# Patient Record
Sex: FEMALE
[De-identification: names, ages, dates, MRNs, and addresses within clinical notes are randomized per-mention and may not be internally consistent; named-entity substitution may affect disease eponyms.]

---

## 2018-01-08 ENCOUNTER — APPOINTMENT (OUTPATIENT)
Dept: SURGICAL ONCOLOGY | Facility: CLINIC | Age: 68
End: 2018-01-08
Payer: MEDICARE

## 2018-01-08 ENCOUNTER — RESULT REVIEW (OUTPATIENT)
Age: 68
End: 2018-01-08

## 2018-01-08 PROBLEM — Z00.00 ENCOUNTER FOR PREVENTIVE HEALTH EXAMINATION: Status: ACTIVE | Noted: 2018-01-08

## 2018-01-08 PROCEDURE — 99204 OFFICE O/P NEW MOD 45 MIN: CPT | Mod: 25

## 2018-01-08 PROCEDURE — 19083 BX BREAST 1ST LESION US IMAG: CPT

## 2018-07-09 ENCOUNTER — APPOINTMENT (OUTPATIENT)
Dept: SURGICAL ONCOLOGY | Facility: CLINIC | Age: 68
End: 2018-07-09

## 2024-08-10 ENCOUNTER — NON-APPOINTMENT (OUTPATIENT)
Age: 74
End: 2024-08-10

## 2024-08-24 ENCOUNTER — NON-APPOINTMENT (OUTPATIENT)
Age: 74
End: 2024-08-24

## 2024-08-25 ENCOUNTER — INPATIENT (INPATIENT)
Facility: HOSPITAL | Age: 74
LOS: 2 days | Discharge: HOME | DRG: 603 | End: 2024-08-28
Attending: INTERNAL MEDICINE | Admitting: INTERNAL MEDICINE
Payer: MEDICARE

## 2024-08-25 VITALS
DIASTOLIC BLOOD PRESSURE: 68 MMHG | OXYGEN SATURATION: 100 % | SYSTOLIC BLOOD PRESSURE: 103 MMHG | HEART RATE: 74 BPM | TEMPERATURE: 98 F | WEIGHT: 199.96 LBS | RESPIRATION RATE: 20 BRPM | HEIGHT: 69 IN

## 2024-08-25 DIAGNOSIS — L03.90 CELLULITIS, UNSPECIFIED: ICD-10-CM

## 2024-08-25 LAB
ALBUMIN SERPL ELPH-MCNC: 3.3 G/DL — SIGNIFICANT CHANGE UP (ref 3.3–5)
ALP SERPL-CCNC: 154 U/L — HIGH (ref 40–120)
ALT FLD-CCNC: 68 U/L — HIGH (ref 10–45)
ANION GAP SERPL CALC-SCNC: 13 MMOL/L — SIGNIFICANT CHANGE UP (ref 5–17)
AST SERPL-CCNC: 24 U/L — SIGNIFICANT CHANGE UP (ref 10–40)
BASOPHILS # BLD AUTO: 0 K/UL — SIGNIFICANT CHANGE UP (ref 0–0.2)
BASOPHILS NFR BLD AUTO: 0 % — SIGNIFICANT CHANGE UP (ref 0–2)
BILIRUB SERPL-MCNC: 0.3 MG/DL — SIGNIFICANT CHANGE UP (ref 0.2–1.2)
BUN SERPL-MCNC: 20 MG/DL — SIGNIFICANT CHANGE UP (ref 7–23)
CALCIUM SERPL-MCNC: 8.8 MG/DL — SIGNIFICANT CHANGE UP (ref 8.4–10.5)
CHLORIDE SERPL-SCNC: 102 MMOL/L — SIGNIFICANT CHANGE UP (ref 96–108)
CO2 SERPL-SCNC: 22 MMOL/L — SIGNIFICANT CHANGE UP (ref 22–31)
CREAT SERPL-MCNC: 0.93 MG/DL — SIGNIFICANT CHANGE UP (ref 0.5–1.3)
CRP SERPL-MCNC: 15 MG/L — HIGH (ref 0–4)
EGFR: 64 ML/MIN/1.73M2 — SIGNIFICANT CHANGE UP
EOSINOPHIL # BLD AUTO: 0.24 K/UL — SIGNIFICANT CHANGE UP (ref 0–0.5)
EOSINOPHIL NFR BLD AUTO: 0.7 % — SIGNIFICANT CHANGE UP (ref 0–6)
GAS PNL BLDV: SIGNIFICANT CHANGE UP
GLUCOSE SERPL-MCNC: 124 MG/DL — HIGH (ref 70–99)
HCT VFR BLD CALC: 34.8 % — SIGNIFICANT CHANGE UP (ref 34.5–45)
HGB BLD-MCNC: 11.6 G/DL — SIGNIFICANT CHANGE UP (ref 11.5–15.5)
LYMPHOCYTES # BLD AUTO: 24.74 K/UL — HIGH (ref 1–3.3)
LYMPHOCYTES # BLD AUTO: 71 % — HIGH (ref 13–44)
MANUAL SMEAR VERIFICATION: SIGNIFICANT CHANGE UP
MCHC RBC-ENTMCNC: 29.8 PG — SIGNIFICANT CHANGE UP (ref 27–34)
MCHC RBC-ENTMCNC: 33.3 GM/DL — SIGNIFICANT CHANGE UP (ref 32–36)
MCV RBC AUTO: 89.5 FL — SIGNIFICANT CHANGE UP (ref 80–100)
MONOCYTES # BLD AUTO: 1.01 K/UL — HIGH (ref 0–0.9)
MONOCYTES NFR BLD AUTO: 2.9 % — SIGNIFICANT CHANGE UP (ref 2–14)
MRSA PCR RESULT.: SIGNIFICANT CHANGE UP
NEUTROPHILS # BLD AUTO: 8.85 K/UL — HIGH (ref 1.8–7.4)
NEUTROPHILS NFR BLD AUTO: 25.4 % — LOW (ref 43–77)
PLAT MORPH BLD: NORMAL — SIGNIFICANT CHANGE UP
PLATELET # BLD AUTO: 194 K/UL — SIGNIFICANT CHANGE UP (ref 150–400)
POTASSIUM SERPL-MCNC: 4.9 MMOL/L — SIGNIFICANT CHANGE UP (ref 3.5–5.3)
POTASSIUM SERPL-SCNC: 4.9 MMOL/L — SIGNIFICANT CHANGE UP (ref 3.5–5.3)
PROT SERPL-MCNC: 6 G/DL — SIGNIFICANT CHANGE UP (ref 6–8.3)
RBC # BLD: 3.89 M/UL — SIGNIFICANT CHANGE UP (ref 3.8–5.2)
RBC # FLD: 14.6 % — HIGH (ref 10.3–14.5)
RBC BLD AUTO: SIGNIFICANT CHANGE UP
S AUREUS DNA NOSE QL NAA+PROBE: SIGNIFICANT CHANGE UP
SMUDGE CELLS # BLD: PRESENT — SIGNIFICANT CHANGE UP
SODIUM SERPL-SCNC: 137 MMOL/L — SIGNIFICANT CHANGE UP (ref 135–145)
WBC # BLD: 34.85 K/UL — HIGH (ref 3.8–10.5)
WBC # FLD AUTO: 34.85 K/UL — HIGH (ref 3.8–10.5)

## 2024-08-25 PROCEDURE — 93970 EXTREMITY STUDY: CPT | Mod: 26

## 2024-08-25 PROCEDURE — 99285 EMERGENCY DEPT VISIT HI MDM: CPT | Mod: FS

## 2024-08-25 PROCEDURE — 73590 X-RAY EXAM OF LOWER LEG: CPT | Mod: 26,LT

## 2024-08-25 PROCEDURE — 73630 X-RAY EXAM OF FOOT: CPT | Mod: 26,LT

## 2024-08-25 RX ORDER — HEPARIN SODIUM,BOVINE 1000/ML
5000 VIAL (ML) INJECTION EVERY 12 HOURS
Refills: 0 | Status: DISCONTINUED | OUTPATIENT
Start: 2024-08-25 | End: 2024-08-28

## 2024-08-25 RX ORDER — SENNA 187 MG
2 TABLET ORAL AT BEDTIME
Refills: 0 | Status: DISCONTINUED | OUTPATIENT
Start: 2024-08-25 | End: 2024-08-28

## 2024-08-25 RX ORDER — CEFAZOLIN SODIUM 2 G/100ML
2000 INJECTION, SOLUTION INTRAVENOUS ONCE
Refills: 0 | Status: COMPLETED | OUTPATIENT
Start: 2024-08-25 | End: 2024-08-25

## 2024-08-25 RX ORDER — ACETAMINOPHEN 325 MG/1
1000 TABLET ORAL ONCE
Refills: 0 | Status: COMPLETED | OUTPATIENT
Start: 2024-08-25 | End: 2024-08-25

## 2024-08-25 RX ORDER — SODIUM CHLORIDE 9 MG/ML
500 INJECTION INTRAMUSCULAR; INTRAVENOUS; SUBCUTANEOUS ONCE
Refills: 0 | Status: COMPLETED | OUTPATIENT
Start: 2024-08-25 | End: 2024-08-25

## 2024-08-25 RX ORDER — CEFAZOLIN SODIUM 2 G/100ML
1000 INJECTION, SOLUTION INTRAVENOUS EVERY 8 HOURS
Refills: 0 | Status: DISCONTINUED | OUTPATIENT
Start: 2024-08-25 | End: 2024-08-28

## 2024-08-25 RX ORDER — DOXYCYCLINE MONOHYDRATE 100 MG
100 TABLET ORAL ONCE
Refills: 0 | Status: COMPLETED | OUTPATIENT
Start: 2024-08-25 | End: 2024-08-25

## 2024-08-25 RX ADMIN — ACETAMINOPHEN 400 MILLIGRAM(S): 325 TABLET ORAL at 14:28

## 2024-08-25 RX ADMIN — SODIUM CHLORIDE 500 MILLILITER(S): 9 INJECTION INTRAMUSCULAR; INTRAVENOUS; SUBCUTANEOUS at 14:26

## 2024-08-25 RX ADMIN — Medication 100 MILLIGRAM(S): at 16:40

## 2024-08-25 RX ADMIN — Medication 2 TABLET(S): at 21:31

## 2024-08-25 RX ADMIN — CEFAZOLIN SODIUM 100 MILLIGRAM(S): 2 INJECTION, SOLUTION INTRAVENOUS at 16:06

## 2024-08-25 RX ADMIN — CEFAZOLIN SODIUM 100 MILLIGRAM(S): 2 INJECTION, SOLUTION INTRAVENOUS at 23:37

## 2024-08-25 NOTE — H&P ADULT - NSHPPHYSICALEXAM_GEN_ALL_CORE
T(C): 36.8 (08-25-24 @ 12:40), Max: 36.8 (08-25-24 @ 12:40)  HR: 74 (08-25-24 @ 12:40) (74 - 74)  BP: 103/68 (08-25-24 @ 12:40) (103/68 - 103/68)  RR: 20 (08-25-24 @ 12:40) (20 - 20)  SpO2: 100% (08-25-24 @ 12:40) (100% - 100%)  GENERAL: NAD, lying in bed comfortably  HEAD:  Atraumatic, normocephalic  EYES: EOMI, PERRLA, conjunctiva and sclera clear  NECK: Supple, trachea midline, no JVD  HEART: Regular rate and rhythm, no murmurs, rubs, or gallops  LUNGS: Unlabored respirations.  Clear to auscultation bilaterally, no crackles, wheezing, or rhonchi  ABDOMEN: Soft, nontender, nondistended, +BS  EXTREMITIES: 2+ peripheral pulses bilaterally. No clubbing, cyanoss    left  arm,  swollen/  redness  NERVOUS SYSTEM:  A&Ox3, moving all extremities, no focal deficits   SKIN: No rashes or lesions T(C): 36.8 (08-25-24 @ 12:40), Max: 36.8 (08-25-24 @ 12:40)  HR: 74 (08-25-24 @ 12:40) (74 - 74)  BP: 103/68 (08-25-24 @ 12:40) (103/68 - 103/68)  RR: 20 (08-25-24 @ 12:40) (20 - 20)  SpO2: 100% (08-25-24 @ 12:40) (100% - 100%)  GENERAL: NAD, lying in bed comfortably  HEAD:  Atraumatic, normocephalic  EYES: EOMI, PERRLA, conjunctiva and sclera clear  NECK: Supple, trachea midline, no JVD  HEART: Regular rate and rhythm, no murmurs, rubs, or gallops  LUNGS: Unlabored respirations.  Clear to auscultation bilaterally, no crackles, wheezing, or rhonchi  ABDOMEN: Soft, nontender, nondistended, +BS  EXTREMITIES:   No clubbing, cyanoss    left   distal  leg/  foot,    swollen/  redness  NERVOUS SYSTEM:  A&Ox3, moving all extremities, no focal deficits   SKIN: No rashes or lesions

## 2024-08-25 NOTE — DISCHARGE NOTE PROVIDER - CARE PROVIDER_API CALL
Liz Vanegas  Podiatric Medicine and Surgery  [address] [place] [id number]  Phone: [phone]  Fax: [phone]  Follow Up Time: 1 week    IBAN KINSEY  Phone: [phone]  Fax: [phone]  Follow Up Time: 1-3 days

## 2024-08-25 NOTE — DISCHARGE NOTE PROVIDER - NSDCFUADDAPPT_GEN_ALL_CORE_FT
Podiatry Discharge Instructions:  Follow up: Please follow up with Dr. Hendrickson within 1 week of discharge from the hospital, please call [phone] for appointment and discuss that you recently were seen in the hospital.  Wound Care: n/a  Weight bearing: Please weight bear as tolerated.  Antibiotics: Please continue as instructed.

## 2024-08-25 NOTE — DISCHARGE NOTE PROVIDER - NSDCCPCAREPLAN_GEN_ALL_CORE_FT
PRINCIPAL DISCHARGE DIAGNOSIS  Diagnosis: Cellulitis  Assessment and Plan of Treatment: Take all of your antibiotics as ordered.  Call your Health Care Provider within two days of arriving home to make a follow up appointment within one week.  If the affected cellulitic area increases in redness, warmth, pain or swelling call your Health Care Provider.  If you develop fever, chills, and/or malaise, call your Health Care Provider.        SECONDARY DISCHARGE DIAGNOSES  Diagnosis: CLL (chronic lymphocytic leukemia)  Assessment and Plan of Treatment: Outpatient follow up with hematology.     PRINCIPAL DISCHARGE DIAGNOSIS  Diagnosis: Cellulitis  Assessment and Plan of Treatment: Left lower extremity improved erythema and +1 pitting edema from digits to proximal midshin, no fluctuance, no bogginess, no drainage, no open wounds. Right lower extremity edema, no open wounds, no acute signs of infection.   - Right foot xray: no OM, no gas.   - Left foot MRI: no abscess, no OM  - Recommend continue antibiotics :Keflex   - No acute podiatric surgical intervention at this time  Follow up with Podraitry   Take all of your antibiotics as ordered.  Call your Health Care Provider within two days of arriving home to make a follow up appointment within one week.  If the affected cellulitic area increases in redness, warmth, pain or swelling call your Health Care Provider.  If you develop fever, chills, and/or malaise, call your Health Care Provider.        SECONDARY DISCHARGE DIAGNOSES  Diagnosis: CLL (chronic lymphocytic leukemia)  Assessment and Plan of Treatment: Follows with Dr Lange of [facility]  --pt w/ known pelvic/inguinal lymphadenopathy. Please have [facility] review imaging done inpatient on 8/27 at [facility].  Outpatient follow up with hematology.

## 2024-08-25 NOTE — H&P ADULT - NSHPLABSRESULTS_GEN_ALL_CORE
LABS:                        11.6   34.85 )-----------( 194      ( 25 Aug 2024 14:23 )             34.8     08-25    137  |  102  |  20  ----------------------------<  124<H>  4.9   |  22  |  0.93    Ca    8.8      25 Aug 2024 14:23    TPro  6.0  /  Alb  3.3  /  TBili  0.3  /  DBili  x   /  AST  24  /  ALT  68<H>  /  AlkPhos  154<H>  08-25          Urinalysis Basic - ( 25 Aug 2024 14:23 )    Color: x / Appearance: x / SG: x / pH: x  Gluc: 124 mg/dL / Ketone: x  / Bili: x / Urobili: x   Blood: x / Protein: x / Nitrite: x   Leuk Esterase: x / RBC: x / WBC x   Sq Epi: x / Non Sq Epi: x / Bacteria: x          08-25 @ 14:10  4.5  19

## 2024-08-25 NOTE — ED ADULT NURSE NOTE - NSFALLRISKINTERV_ED_ALL_ED

## 2024-08-25 NOTE — H&P ADULT - ASSESSMENT
73 y/o female         PMHx  CLL  being monitored  with [facility],           DM,    h/o   breast cancer s/p chemo radiation,    withj   chronic lymphedema RLE           presenting to the ED with LLE pain swelling and erythema  for  6  weeks          pt   reported the symptoms started 6 weeks ago with fevers and worsened over last two weeks.        pain  worsened over the last two days.   sent  to e r by   u/c,  pt  was on a  5 day course of antibiotics on 7/1/24. /    denies   cp/sob , prolonged sedentary period, trauma, fall, drainage     admitted with  left leg  pain/  swelling  for  6  weeks.  denies   trauma/  falls          was  at  [facility],  had  redness   feet/  tx  with  anti    fungal  rx         also  has  had  night  sweats   for past  week    DM         not on med s at  home   Ca  breast,   right ,   about   5  yrs  ago / s/p  lumpectomy  .  with     c/c right  arm  edema           onc [facility]    CLL,  f/p  at  [facility],  caio gaytan       baseline  wbc  in 30, 000  range    dvt  ppx      doppler   legs ,  no  dvt       discussed   with  daughter        pt  is  full  code         rad                              75 y/o female         PMHx  CLL  being monitored  with [facility],           DM,    h/o   breast cancer s/p chemo radiation,    withj   chronic lymphedema RLE           presenting to the ED with LLE pain swelling and erythema  for  6  weeks          pt   reported the symptoms started 6 weeks ago with fevers and worsened over last two weeks.        pain  worsened over the last two days.   sent  to e r by   u/c,  pt  was on a  5 day course of antibiotics on 7/1/24. /    denies   cp/sob , prolonged sedentary period, trauma, fall, drainage     admitted with  left leg  pain/  swelling  for  6  weeks.  denies   trauma/  falls.    was  at  [facility],  had  redness   feet/  tx  with  anti    fungal  rx           cellulitis  leg.  on iv ancef             house  ID              also  has  had  night  sweats   for past  week/  suspect  from  CLL.  ctc /ap,            DM         not on med s at  home   Ca  breast,   right ,   about   5  yrs  ago / s/p  lumpectomy  .  with     c/c right  arm  edema           onc [facility]    CLL,  f/p  at  [facility],  caio gaytan       baseline  wbc  in 30, 000  range    dvt  ppx      doppler   legs ,  no  dvt       discussed   with  daughter        pt  is  full  code                                      75 y/o female           h/o   CLL  being monitored  with [facility],           DM,  ,, h/o   breast cancer s/p chemo  radiation,    mild  chronic lymphedema RLE             c/o   LLE pain swelling and erythema  for  6  weeks ,  with fevers and worsened over last two weeks.         pain  worsened over the last two days.   sent  to e r by   u/c,  pt  was on a  5 day course of antibiotics on 7/1/24. /    denies   cp/sob , prolonged sedentary period, trauma, fall, drainage     admitted with  left leg  pain/ distal  leg  swelling / redness  for  6  weeks.  denies   trauma/  falls.    was  at  [facility],  had  redness of   foot,   tx  with  anti    fungal  rx           cellulitis  leg.  on iv ancef            ct  foot/ r/o collection              house  ID              also  has  had  night  sweats   for past  week/  suspect  from  CLL.   /CT  c /ap,            DM         not on meds    at  home, per  daughter    Ca  breast,   right ,   about   5  yrs  ago / s/p  lumpectomy  .  with      mild  c/c right  arm  edema             [place] [facility]    [place]           [place]  [facility]  [place]  [place]          baseline  wbc  in 30, 000  range    dvt  ppx        doppler   legs ,  no  dvt       discussed   with  daughter        pt  is  full  code                                      75 y/o female           h/o   CLL  being monitored  with [facility],           DM,  ,, h/o   breast cancer s/p chemo  radiation,    mild  chronic lymphedema RLE             c/o   LLE pain swelling and erythema  for  6  weeks ,  with fevers and worsened over last two weeks.         pain  worsened over the last two days.   sent  to e r by   u/c,  pt  was on a  5 day course of antibiotics on 7/1/24. /    denies   cp/sob , prolonged sedentary period, trauma, fall, drainage     admitted with  left leg  pain/ distal  leg  swelling / redness  for  6  weeks.  denies   trauma/  falls.    was  at  [facility],  had  redness of   foot,   tx  with  anti    fungal  rx           cellulitis   distal  leg. / foot /  pt  walks  bare foot  at  home              on iv ancef             ct  foot/ r/o collection /  marked  swelling dorsum  foot             house  ID  /  podiatry  eval  called            also  has  had  night  sweats   for past  week/  suspect  from  CLL.   /CT  c /ap,            DM         not on meds    at  home, per  daughter    Ca  breast,   right ,   about   5  yrs  ago / s/p  lumpectomy  .  with      mild  c/c right  arm  edema             onc [facility]    [facility]           [facility]  not  [facility]          baseline  wbc  in 30, 000  range    dvt  ppx        doppler   legs ,  no  dvt       discussed   with  daughter        pt  is  full  code

## 2024-08-25 NOTE — ED ADULT NURSE NOTE - OBJECTIVE STATEMENT
74 y.o. female coming in from  via private car for swelling of b/l lower extremities and redness noted to LLE. pt states that she was seen by her PCP and was started on ABX on 7/1/24 and states that she had improved symptoms but states that about 2 weeks ago the redness/swelling/pain returned with worsening over the last 2 days. pt went to  today and was told to come into the ER for farther evaluation and concern for osteomyelitis. PMH CLL. A&Ox3, vss, difficulty ambulating r/t pain in LLE, no drainage noted from LLE, pt denies CP/SOB/weakness/dizziness/fevers/chills/N/V/D/urinary symptoms, no other complaints at this time.

## 2024-08-25 NOTE — DISCHARGE NOTE PROVIDER - NSDCMRMEDTOKEN_GEN_ALL_CORE_FT
Ambien 10 mg oral tablet: 1 tab(s) orally once a day as needed for  insomnia  amLODIPine 5 mg oral tablet: 1 tab(s) orally once a day  atorvastatin 40 mg oral tablet: 1 tab(s) orally once a day  ergocalciferol 1.25 mg (50,000 intl units) oral capsule: 1 cap(s) orally every 10 days  hydroCHLOROthiazide 25 mg oral tablet: 1 tab(s) orally once a day  Janumet 50 mg-500 mg oral tablet: 1 tab(s) orally 2 times a day  losartan 25 mg oral tablet: 1 tab(s) orally once a day  magnesium oxide 400 mg oral tablet: 1 tab(s) orally once a day  pentoxifylline 400 mg oral tablet, extended release: 1 tab(s) orally once a day  primidone 50 mg oral tablet: 1 tab(s) orally 2 times a day  propranolol 80 mg oral tablet: 1 tab(s) orally 2 times a day  sertraline 50 mg oral tablet: 1 tab(s) orally once a day  Vascepa 1 g oral capsule: 2 cap(s) orally once a day   Ambien 10 mg oral tablet: 1 tab(s) orally once a day as needed for  insomnia  atorvastatin 40 mg oral tablet: 1 tab(s) orally once a day  cephalexin 500 mg oral capsule: 1 cap(s) orally 4 times a day  ergocalciferol 1.25 mg (50,000 intl units) oral capsule: 1 cap(s) orally every 10 days  hydroCHLOROthiazide 25 mg oral tablet: 1 tab(s) orally once a day  Janumet 50 mg-500 mg oral tablet: 1 tab(s) orally 2 times a day  losartan 25 mg oral tablet: 1 tab(s) orally once a day  magnesium oxide 400 mg oral tablet: 1 tab(s) orally once a day  primidone 50 mg oral tablet: 1 tab(s) orally 2 times a day  propranolol 80 mg oral tablet: 1 tab(s) orally 2 times a day  senna leaf extract oral tablet: 2 tab(s) orally once a day (at bedtime)  sertraline 50 mg oral tablet: 1 tab(s) orally once a day  Vascepa 1 g oral capsule: 2 cap(s) orally once a day

## 2024-08-25 NOTE — DISCHARGE NOTE PROVIDER - NSDCADMDATE_GEN_ALL_CORE_FT
25-Aug-2024 15:21
Plan: Discussed possibility of allergy testing by an allergist
Discontinue Regimen: Allegra D
Detail Level: Detailed
Otc Regimen: Allegra QAM x 1 week\\nClaritin QHS x 1 week \\nIncrease to 2 Claritin QHS if not improved and will follow up in 1 month

## 2024-08-25 NOTE — H&P ADULT - HISTORY OF PRESENT ILLNESS
·  75 y/o female         PMHx CLL [place] [facility] DM, breast cancer s/p chemo radiation  now, chronic lymphedema RLE           presenting to the ED with LLE pain swelling and erythema      , Patient reported the symptoms started 6 weeks ago with fevers and worsened over last two weeks.       pain  worsened over the last two days. Sent in by Urgent Care ,  reyna otto on a  5 day course of antibiotics on 7/1/24.         deneis   cp/sob , prolonged sedentary period, trauma, fall, drainage   ·  75 y/o female         PMHx CLL [place] [facility] DM, breast cancer s/p chemo radiation  now, chronic lymphedema RLE           presenting to the ED with LLE pain swelling and erythema  for  6  weeks          pt   reported the symptoms started 6 weeks ago with fevers and worsened over last two weeks.       pain  worsened over the last two days.         sent  to e r by   u/c,  pt  was on a  5 day course of antibiotics on 7/1/24.         denies   cp/sob , prolonged sedentary period, trauma, fall, drainage

## 2024-08-25 NOTE — DISCHARGE NOTE PROVIDER - PROVIDER TOKENS
PROVIDER:[TOKEN:[[id number]],FOLLOWUP:[1 week]],PROVIDER:[TOKEN:[[id number]],FOLLOWUP:[1-3 days]]

## 2024-08-25 NOTE — PATIENT PROFILE ADULT - FALL HARM RISK - UNIVERSAL INTERVENTIONS
Bed in lowest position, wheels locked, appropriate side rails in place/Call bell, personal items and telephone in reach/Instruct patient to call for assistance before getting out of bed or chair/Non-slip footwear when patient is out of bed/[place] to call system/Physically safe environment - no spills, clutter or unnecessary equipment/Purposeful Proactive Rounding/Room/bathroom lighting operational, light cord in reach

## 2024-08-25 NOTE — DISCHARGE NOTE PROVIDER - HOSPITAL COURSE
HPI:  ·  73 y/o female         PMHx CLL being monitored with [facility], DM, breast cancer s/p chemo radiation  now, chronic lymphedema RLE           presenting to the ED with LLE pain swelling and erythema  for  6  weeks          pt   reported the symptoms started 6 weeks ago with fevers and worsened over last two weeks.       pain  worsened over the last two days.         sent  to e r by   u/c,  pt  was on a  5 day course of antibiotics on 7/1/24.         denies   cp/sob , prolonged sedentary period, trauma, fall, drainage   (25 Aug 2024 16:16)    Hospital Course: Patient admitted for LE erythema and swelling , patient seen by podiatry and Infectious disease, imaging of lower extremity show no osteomyelitis . Sandra had wound care provided by podiatry. and treated with antibiotics. Patient is now medically cleared for discharge and c/w oral antibiotics (keflex)  as instructed. Patient has CLL  --follows with Dr Lange of [facility]  --pt w/ known pelvic/inguinal lymphadenopathy. Will have [facility] review imaging done inpatient on 8/27  --on surveillance, ongoing care after discharge.  Patient is now medically cleared for discharge and outpatient follow up with hematology and podiatry            Important Medication Changes and Reason: keflex    Active or Pending Issues Requiring Follow-up: hematology and podiatry    Advanced Directives:   [x ] Full code  [ ] DNR  [ ] Hospice    Discharge Diagnoses:  CLL  cellulitis of lower extremity  DM           HPI:  ·  73 y/o female         PMHx CLL being monitored with [facility], DM, breast cancer s/p chemo radiation  now, chronic lymphedema RLE           presenting to the ED with LLE pain swelling and erythema  for  6  weeks          pt   reported the symptoms started 6 weeks ago with fevers and worsened over last two weeks.       pain  worsened over the last two days.         sent  to e r by   u/c,  pt  was on a  5 day course of antibiotics on 7/1/24.         denies   cp/sob , prolonged sedentary period, trauma, fall, drainage   (25 Aug 2024 16:16)    Hospital Course: Patient admitted for LE erythema and swelling, found to have cellulitis. Podiatry and Infectious diseases consulted ->  imaging of lower extremity show no osteomyelitis. Patient had wound care provided by podiatry and treated with ancef.  Of note pt verbalized having night sweats Patient has hx off CLL, follows with Dr Lange of [facility]  Pt w/ known pelvic/inguinal lymphadenopathy. Will have [facility] review imaging done inpatient on 8/27. On surveillance, ongoing care after discharge. Patient is now medically cleared for discharge and c/w oral antibiotics (keflex)  as instructed. Patient is now medically cleared for discharge and outpatient follow up with hematology and podiatry          Important Medication Changes and Reason: keflex    Active or Pending Issues Requiring Follow-up: hematology and podiatry    Advanced Directives:   [x ] Full code  [ ] DNR  [ ] Hospice    Discharge Diagnoses:  CLL  cellulitis of lower extremity  DM

## 2024-08-25 NOTE — ED PROVIDER NOTE - OBJECTIVE STATEMENT
73 y/o female PMHx CLL [place] [facility], breast cancer s/p chemo radiation now, chronic lymphedema RLE presenting to the ED with LLE pain swelling and erythema, Patient reported the symptoms started 6 weeks ago with fevers and worsened over last two weeks. Pain worsened over the last two days. Sent in by Urgent Care concern for osteo. Was on 5 day course of antibiotics on 7/1/24. Denied CP, SOB, prolonged sedentary period, trauma, fall, drainage

## 2024-08-25 NOTE — CONSULT NOTE ADULT - SUBJECTIVE AND OBJECTIVE BOX
Patient is a 74y old  Female who presents with a chief complaint of LLE swelling/  pain (25 Aug 2024 16:16)      HPI:  ·  75 y/o female         PMHx CLL being monitored with [facility] [place], DM, breast cancer s/p chemo radiation  now, chronic lymphedema RLE         presenting to the ED with LLE pain swelling and erythema  for  6  weeks          pt   reported the symptoms started 6 weeks ago with fevers and worsened over last two weeks.       pain  worsened over the last two days.         sent  to e r by   u/c,  pt  was on a  5 day course of antibiotics on 7/1/24.         denies   cp/sob , prolonged sedentary period, trauma, fall, drainage   (25 Aug 2024 16:16)      PAST MEDICAL & SURGICAL HISTORY:  Breast cancer          MEDICATIONS  (STANDING):  ceFAZolin   IVPB 1000 milliGRAM(s) IV Intermittent every 8 hours  heparin   Injectable 5000 Unit(s) SubCutaneous every 12 hours  senna 2 Tablet(s) Oral at bedtime    MEDICATIONS  (PRN):      Allergies    No Known Allergies    Intolerances        VITALS:    Vital Signs Last 24 Hrs  T(C): 36.8 (25 Aug 2024 12:40), Max: 36.8 (25 Aug 2024 12:40)  T(F): 98.3 (25 Aug 2024 12:40), Max: 98.3 (25 Aug 2024 12:40)  HR: 74 (25 Aug 2024 12:40) (74 - 74)  BP: 103/68 (25 Aug 2024 12:40) (103/68 - 103/68)  BP(mean): --  RR: 20 (25 Aug 2024 12:40) (20 - 20)  SpO2: 100% (25 Aug 2024 12:40) (100% - 100%)    Parameters below as of 25 Aug 2024 12:40  Patient On (Oxygen Delivery Method): room air        LABS:                          11.6   34.85 )-----------( 194      ( 25 Aug 2024 14:23 )             34.8       08-25    137  |  102  |  20  ----------------------------<  124<H>  4.9   |  22  |  0.93    Ca    8.8      25 Aug 2024 14:23    TPro  6.0  /  Alb  3.3  /  TBili  0.3  /  DBili  x   /  AST  24  /  ALT  68<H>  /  AlkPhos  154<H>  08-25      CAPILLARY BLOOD GLUCOSE              LOWER EXTREMITY PHYSICAL EXAM:    Vascular: DP/PT _/4, B/L, CFT <_ seconds B/L, Temperature gradient _, B/L.   Neuro: Epicritic sensation _ to the level of _, B/L.  Musculoskeletal/Ortho:  Skin: Left lower extremity erythema and edema from digits to proximal midshin, no open wounds. Right lower extremity edema, no open wounds, no acute signs of infection.       RADIOLOGY & ADDITIONAL STUDIES:      ACC: [id number] EXAM:  XR TIB FIB AP LAT 2 VIEWS LT   ORDERED BY: CIARAN ALMENDAREZ     ACC: [id number] EXAM:  XR FOOT COMP MIN 3 VIEWS LT   ORDERED BY: CIARAN ALMENDAREZ     PROCEDURE DATE:  08/25/2024          INTERPRETATION:  CLINICAL INDICATION: Worsening left foot pain swelling   and redness, evaluate for gas..  TECHNIQUE: 3 view left foot. 2 view left tibia.    COMPARISON: None available.    FINDINGS:    No acute fracture or dislocation. No destructive bony lesions or   periosteal reaction noted. Plantar and dorsal calcaneal spurs. Mild soft   tissue swelling.  Partially imaged degenerative changes of the left knee.    IMPRESSION:  No fracture or dislocation.  No definite radiographic evidence of osteomyelitis. If osteomyelitis is   suspected, an MRI could be obtained    --- End of Report ---           ROGER PEÑA MD; Resident Radiologist  This document has been electronically signed.  JENNIFER VILLEGAS MD; Attending Radiologist  This document has been electronically signed. Aug 25 2024  5:19PM   Patient is a 74y old  Female who presents with a chief complaint of LLE swelling/  pain (25 Aug 2024 16:16)      HPI:  ·  73 y/o female         PMHx CLL being monitored with [facility], DM, breast cancer s/p chemo radiation  now, chronic lymphedema RLE         presenting to the ED with LLE pain swelling and erythema  for  6  weeks          pt   reported the symptoms started 6 weeks ago with fevers and worsened over last two weeks.       pain  worsened over the last two days.         sent  to e r by   u/c,  pt  was on a  5 day course of antibiotics on 7/1/24.         denies   cp/sob , prolonged sedentary period, trauma, fall, drainage   (25 Aug 2024 16:16)      PAST MEDICAL & SURGICAL HISTORY:  Breast cancer          MEDICATIONS  (STANDING):  ceFAZolin   IVPB 1000 milliGRAM(s) IV Intermittent every 8 hours  heparin   Injectable 5000 Unit(s) SubCutaneous every 12 hours  senna 2 Tablet(s) Oral at bedtime    MEDICATIONS  (PRN):      Allergies    No Known Allergies    Intolerances        VITALS:    Vital Signs Last 24 Hrs  T(C): 36.8 (25 Aug 2024 12:40), Max: 36.8 (25 Aug 2024 12:40)  T(F): 98.3 (25 Aug 2024 12:40), Max: 98.3 (25 Aug 2024 12:40)  HR: 74 (25 Aug 2024 12:40) (74 - 74)  BP: 103/68 (25 Aug 2024 12:40) (103/68 - 103/68)  BP(mean): --  RR: 20 (25 Aug 2024 12:40) (20 - 20)  SpO2: 100% (25 Aug 2024 12:40) (100% - 100%)    Parameters below as of 25 Aug 2024 12:40  Patient On (Oxygen Delivery Method): room air        LABS:                          11.6   34.85 )-----------( 194      ( 25 Aug 2024 14:23 )             34.8       08-25    137  |  102  |  20  ----------------------------<  124<H>  4.9   |  22  |  0.93    Ca    8.8      25 Aug 2024 14:23    TPro  6.0  /  Alb  3.3  /  TBili  0.3  /  DBili  x   /  AST  24  /  ALT  68<H>  /  AlkPhos  154<H>  08-25      CAPILLARY BLOOD GLUCOSE              LOWER EXTREMITY PHYSICAL EXAM:    Vascular: DP/PT 2/4, B/L, CFT <3 seconds B/L, Left Temperature gradient warm to warm, Right Temperature gradient warm to cool  Neuro: Epicritic sensation intact to the level ofdigits, B/L.  Musculoskeletal/Ortho: manual muscle testing 5/5 in all four muscle groups  Skin: Left lower extremity erythema and edema from digits to proximal midshin, no open wounds. Right lower extremity edema, no open wounds, no acute signs of infection.       RADIOLOGY & ADDITIONAL STUDIES:      ACC: [id number] EXAM:  XR TIB FIB AP LAT 2 VIEWS LT   ORDERED BY: CIARAN ALMENDAREZ     ACC: [id number] EXAM:  XR FOOT COMP MIN 3 VIEWS LT   ORDERED BY: CIARAN ALMENDAREZ     PROCEDURE DATE:  08/25/2024          INTERPRETATION:  CLINICAL INDICATION: Worsening left foot pain swelling   and redness, evaluate for gas..  TECHNIQUE: 3 view left foot. 2 view left tibia.    COMPARISON: None available.    FINDINGS:    No acute fracture or dislocation. No destructive bony lesions or   periosteal reaction noted. Plantar and dorsal calcaneal spurs. Mild soft   tissue swelling.  Partially imaged degenerative changes of the left knee.    IMPRESSION:  No fracture or dislocation.  No definite radiographic evidence of osteomyelitis. If osteomyelitis is   suspected, an MRI could be obtained    --- End of Report ---           ROGER PEÑA MD; Resident Radiologist  This document has been electronically signed.  JENNIFER VILLEGAS MD; Attending Radiologist  This document has been electronically signed. Aug 25 2024  5:19PM

## 2024-08-25 NOTE — CHART NOTE - NSCHARTNOTEFT_GEN_A_CORE
Paged podiatry at 1810 for consult regarding left foot wound  Awaiting call back     Karla Little PA-C  Dept of Medicine
Advanced Care Planning:  I have had goals of care discussion, advanced directive and code status with patient/family    and  in  coordinating   patient care.     all questions answered and expressed understanding of the plan.   pt  is  full  code

## 2024-08-26 DIAGNOSIS — L03.116 CELLULITIS OF LEFT LOWER LIMB: ICD-10-CM

## 2024-08-26 LAB
ANION GAP SERPL CALC-SCNC: 10 MMOL/L — SIGNIFICANT CHANGE UP (ref 5–17)
BUN SERPL-MCNC: 15 MG/DL — SIGNIFICANT CHANGE UP (ref 7–23)
CALCIUM SERPL-MCNC: 8.6 MG/DL — SIGNIFICANT CHANGE UP (ref 8.4–10.5)
CHLORIDE SERPL-SCNC: 106 MMOL/L — SIGNIFICANT CHANGE UP (ref 96–108)
CO2 SERPL-SCNC: 23 MMOL/L — SIGNIFICANT CHANGE UP (ref 22–31)
CREAT SERPL-MCNC: 0.88 MG/DL — SIGNIFICANT CHANGE UP (ref 0.5–1.3)
EGFR: 69 ML/MIN/1.73M2 — SIGNIFICANT CHANGE UP
GLUCOSE BLDC GLUCOMTR-MCNC: 132 MG/DL — HIGH (ref 70–99)
GLUCOSE BLDC GLUCOMTR-MCNC: 173 MG/DL — HIGH (ref 70–99)
GLUCOSE SERPL-MCNC: 117 MG/DL — HIGH (ref 70–99)
HCT VFR BLD CALC: 33.9 % — LOW (ref 34.5–45)
HGB BLD-MCNC: 11.2 G/DL — LOW (ref 11.5–15.5)
MCHC RBC-ENTMCNC: 29.8 PG — SIGNIFICANT CHANGE UP (ref 27–34)
MCHC RBC-ENTMCNC: 33 GM/DL — SIGNIFICANT CHANGE UP (ref 32–36)
MCV RBC AUTO: 90.2 FL — SIGNIFICANT CHANGE UP (ref 80–100)
NRBC # BLD: 0 /100 WBCS — SIGNIFICANT CHANGE UP (ref 0–0)
PLATELET # BLD AUTO: 157 K/UL — SIGNIFICANT CHANGE UP (ref 150–400)
POTASSIUM SERPL-MCNC: 4.5 MMOL/L — SIGNIFICANT CHANGE UP (ref 3.5–5.3)
POTASSIUM SERPL-SCNC: 4.5 MMOL/L — SIGNIFICANT CHANGE UP (ref 3.5–5.3)
RBC # BLD: 3.76 M/UL — LOW (ref 3.8–5.2)
RBC # FLD: 14.6 % — HIGH (ref 10.3–14.5)
SODIUM SERPL-SCNC: 142 MMOL/L — SIGNIFICANT CHANGE UP (ref 135–145)
WBC # BLD: 23.78 K/UL — HIGH (ref 3.8–10.5)
WBC # FLD AUTO: 23.78 K/UL — HIGH (ref 3.8–10.5)

## 2024-08-26 PROCEDURE — 73720 MRI LWR EXTREMITY W/O&W/DYE: CPT | Mod: 26,LT

## 2024-08-26 PROCEDURE — 99222 1ST HOSP IP/OBS MODERATE 55: CPT | Mod: GC

## 2024-08-26 RX ORDER — ZOLPIDEM TARTRATE 5 MG/1
5 TABLET, FILM COATED ORAL ONCE
Refills: 0 | Status: DISCONTINUED | OUTPATIENT
Start: 2024-08-26 | End: 2024-08-26

## 2024-08-26 RX ORDER — ACETAMINOPHEN 325 MG/1
650 TABLET ORAL ONCE
Refills: 0 | Status: COMPLETED | OUTPATIENT
Start: 2024-08-26 | End: 2024-08-26

## 2024-08-26 RX ADMIN — CEFAZOLIN SODIUM 100 MILLIGRAM(S): 2 INJECTION, SOLUTION INTRAVENOUS at 05:17

## 2024-08-26 RX ADMIN — CEFAZOLIN SODIUM 100 MILLIGRAM(S): 2 INJECTION, SOLUTION INTRAVENOUS at 21:07

## 2024-08-26 RX ADMIN — ZOLPIDEM TARTRATE 5 MILLIGRAM(S): 5 TABLET, FILM COATED ORAL at 23:32

## 2024-08-26 RX ADMIN — Medication 5000 UNIT(S): at 17:46

## 2024-08-26 RX ADMIN — Medication 2 TABLET(S): at 21:07

## 2024-08-26 RX ADMIN — ACETAMINOPHEN 400 MILLIGRAM(S): 325 TABLET ORAL at 00:14

## 2024-08-26 RX ADMIN — Medication 5000 UNIT(S): at 05:18

## 2024-08-26 RX ADMIN — CEFAZOLIN SODIUM 100 MILLIGRAM(S): 2 INJECTION, SOLUTION INTRAVENOUS at 13:26

## 2024-08-26 RX ADMIN — ACETAMINOPHEN 650 MILLIGRAM(S): 325 TABLET ORAL at 09:29

## 2024-08-26 RX ADMIN — ACETAMINOPHEN 650 MILLIGRAM(S): 325 TABLET ORAL at 10:20

## 2024-08-26 RX ADMIN — ACETAMINOPHEN 1000 MILLIGRAM(S): 325 TABLET ORAL at 00:30

## 2024-08-26 NOTE — PROGRESS NOTE ADULT - SUBJECTIVE AND OBJECTIVE BOX
Patient is a 74y old  Female who presents with a chief complaint of arn  swelling/  pain (25 Aug 2024 19:25)       INTERVAL HPI/OVERNIGHT EVENTS:  Patient seen and evaluated at bedside.  Pt is resting comfortable in NAD. Denies N/V/F/C.  Pain rated at X/10    Allergies    No Known Allergies    Intolerances        Vital Signs Last 24 Hrs  T(C): 36.7 (26 Aug 2024 05:26), Max: 36.8 (25 Aug 2024 12:40)  T(F): 98.1 (26 Aug 2024 05:26), Max: 98.3 (25 Aug 2024 12:40)  HR: 64 (26 Aug 2024 05:26) (64 - 79)  BP: 105/62 (26 Aug 2024 05:26) (103/66 - 105/62)  BP(mean): --  RR: 18 (26 Aug 2024 05:26) (18 - 20)  SpO2: 98% (26 Aug 2024 05:26) (95% - 100%)    Parameters below as of 26 Aug 2024 05:26  Patient On (Oxygen Delivery Method): room air        LABS:                        11.6   34.85 )-----------( 194      ( 25 Aug 2024 14:23 )             34.8     08-25    137  |  102  |  20  ----------------------------<  124<H>  4.9   |  22  |  0.93    Ca    8.8      25 Aug 2024 14:23    TPro  6.0  /  Alb  3.3  /  TBili  0.3  /  DBili  x   /  AST  24  /  ALT  68<H>  /  AlkPhos  154<H>  08-25      Urinalysis Basic - ( 25 Aug 2024 14:23 )    Color: x / Appearance: x / SG: x / pH: x  Gluc: 124 mg/dL / Ketone: x  / Bili: x / Urobili: x   Blood: x / Protein: x / Nitrite: x   Leuk Esterase: x / RBC: x / WBC x   Sq Epi: x / Non Sq Epi: x / Bacteria: x      CAPILLARY BLOOD GLUCOSE          Lower Extremity Physical Exam:    Vascular: DP/PT 2/4, B/L, CFT <3 seconds B/L, Left Temperature gradient warm to warm, Right Temperature gradient warm to cool  Neuro: Epicritic sensation intact to the level ofdigits, B/L.  Musculoskeletal/Ortho: manual muscle testing 5/5 in all four muscle groups  Skin: Left lower extremity erythema and non pitting edema from digits to proximal midshin, no fluctuance, no bogginess, no drainage, no open wounds. Right lower extremity edema, no open wounds, no acute signs of infection.     RADIOLOGY & ADDITIONAL TESTS:    < from: Xray Foot AP + Lateral + Oblique, Left (08.25.24 @ 14:45) >    ACC: [id number] EXAM:  XR TIB FIB AP LAT 2 VIEWS LT   ORDERED BY: CIARAN ALMENDAREZ     ACC: [id number] EXAM:  XR FOOT COMP MIN 3 VIEWS LT   ORDERED BY: CIARAN ALMENDAREZ     PROCEDURE DATE:  08/25/2024          INTERPRETATION:  CLINICAL INDICATION: Worsening left foot pain swelling   and redness, evaluate for gas..  TECHNIQUE: 3 view left foot. 2 view left tibia.    COMPARISON: None available.    FINDINGS:    No acute fracture or dislocation. No destructive bony lesions or   periosteal reaction noted. Plantar and dorsal calcaneal spurs. Mild soft   tissue swelling.  Partially imaged degenerative changes of the left knee.    IMPRESSION:  No fracture or dislocation.  No definite radiographic evidence of osteomyelitis. If osteomyelitis is   suspected, an MRI could be obtained    --- End of Report ---           ROGER PEÑA MD; Resident Radiologist  This document has been electronically signed.  JENNIFER VILLEGAS MD; Attending Radiologist  This document has been electronically signed. Aug 25 2024  5:19PM    < end of copied text >

## 2024-08-26 NOTE — PROGRESS NOTE ADULT - SUBJECTIVE AND OBJECTIVE BOX
date of service: 08-26-24 @ 08:33  afberile  REVIEW OF SYSTEMS:  CONSTITUTIONAL: No fever,  no  weight loss  ENT:  No  tinnitus,   no   vertigo  NECK: No pain or stiffness  RESPIRATORY: No cough, wheezing, chills or hemoptysis;    No Shortness of Breath  CARDIOVASCULAR: No chest pain, palpitations, dizziness  GASTROINTESTINAL: No abdominal or epigastric pain. No nausea, vomiting, or hematemesis; No diarrhea  No melena or hematochezia.  GENITOURINARY: No dysuria, frequency, hematuria, or incontinence  NEUROLOGICAL: No headaches  SKIN: No itching,  no   rash  LYMPH Nodes: No enlarged glands  ENDOCRINE: No heat or cold intolerance  MUSCULOSKELETAL: No joint pain or swelling  PSYCHIATRIC: No depression, anxiety  HEME/LYMPH: No easy bruising, or bleeding gums  ALLERGY AND IMMUNOLOGIC: No hives or eczema	    MEDICATIONS  (STANDING):  ceFAZolin   IVPB 1000 milliGRAM(s) IV Intermittent every 8 hours  heparin   Injectable 5000 Unit(s) SubCutaneous every 12 hours  senna 2 Tablet(s) Oral at bedtime    MEDICATIONS  (PRN):      Vital Signs Last 24 Hrs  T(C): 36.8 (26 Aug 2024 08:21), Max: 36.8 (25 Aug 2024 12:40)  T(F): 98.2 (26 Aug 2024 08:21), Max: 98.3 (25 Aug 2024 12:40)  HR: 83 (26 Aug 2024 08:21) (64 - 83)  BP: 112/66 (26 Aug 2024 08:21) (103/66 - 112/66)  BP(mean): --  RR: 18 (26 Aug 2024 08:21) (18 - 20)  SpO2: 97% (26 Aug 2024 08:21) (95% - 100%)    Parameters below as of 26 Aug 2024 08:21  Patient On (Oxygen Delivery Method): room air      CAPILLARY BLOOD GLUCOSE        I&O's Summary    25 Aug 2024 07:01  -  26 Aug 2024 07:00  --------------------------------------------------------  IN: 350 mL / OUT: 0 mL / NET: 350 mL          Appearance: Normal	  HEENT:   Normal oral mucosa, PERRL, EOMI	  Lymphatic: No lymphadenopathy  Cardiovascular: Normal S1 S2, No JVD  Respiratory: Lungs clear to auscultation	  Gastrointestinal:  Soft, Non-tender, + BS	  Skin: No rash, No ecchymoses	  Extremities:     swelling/  redness,  foot  LABS:                        11.2   23.78 )-----------( 157      ( 26 Aug 2024 07:27 )             33.9     08-25    137  |  102  |  20  ----------------------------<  124<H>  4.9   |  22  |  0.93    Ca    8.8      25 Aug 2024 14:23    TPro  6.0  /  Alb  3.3  /  TBili  0.3  /  DBili  x   /  AST  24  /  ALT  68<H>  /  AlkPhos  154<H>  08-25          Urinalysis Basic - ( 25 Aug 2024 14:23 )    Color: x / Appearance: x / SG: x / pH: x  Gluc: 124 mg/dL / Ketone: x  / Bili: x / Urobili: x   Blood: x / Protein: x / Nitrite: x   Leuk Esterase: x / RBC: x / WBC x   Sq Epi: x / Non Sq Epi: x / Bacteria: x          08-25 @ 14:10  4.5  19              Consultant(s) Notes Reviewed:      Care Discussed with Consultants/Other Providers:

## 2024-08-26 NOTE — PROGRESS NOTE ADULT - ASSESSMENT
73 y/o female           h/o   CLL  being monitored  with [facility] [place],           DM,  ,, h/o   breast cancer s/p chemo  radiation,    mild  chronic lymphedema RLE             c/o   LLE pain swelling.  fevers,  erythema  for  6  weeks , worsened over last two weeks.         pain  worsened over the last two days.   sent  to er by   u/c,  5 day course of antibiotics on 7/1/24.      denies   cp/sob , prolonged sedentary period, trauma, fall, drainage     admitted with  left leg  pain/ distal  leg  swelling / redness  for  6  weeks.  denies   trauma/  falls.           was  at  [facility],  had  redness of   foot,   tx  with  anti    fungal  rx     cellulitis   distal  leg. / foot /  pt  walks  bare foot  at  home            on iv ancef            ct  foot/ r/o collection /  marked  swelling dorsum  foot             house  ID  /  podiatry  eval  called on  arrival             also  has  had  night  sweats   for past  week/  suspect  from  CLL.   /CT  c /ap,            DM         not on meds    at  home, per  daughter    Ca  breast,   right ,   about   5  yrs  ago / s/p  lumpectomy  .  with      mild  c/c right  arm  edema             onc [facility]    CLL,           f/p  at  [facility],  caio gaytan.  not  on rx          baseline  wbc  in 30, 000  range    dvt  ppx        doppler   legs ,  no  dvt     wbc  23,000  now,  on iv ancef/  awaiting  imaging   and  ID  . seen  by  podiatry       discussed   with  daughter /     pt  is  full  code

## 2024-08-26 NOTE — PROGRESS NOTE ADULT - ATTENDING COMMENTS
Patient seen and examined. Chart with pertinent labs and imaging reviewed.  Appears to have had some intial response to 5-day course of Keflex as outpatient.  However while redness, edema, and pain invloving toes 2 and 3 rapidly recurred, the redness/edema involving the hindfoot, ankle, and distal leg started perhaps 7 days ago  MRI pending-- deeper focus?  No hx of gout etc, nor similar episodes.  Seems to have improved w cefazolin  Await MRI repot  Cont cefazolin  Thank you for the courtesy of this referral.  Charan Barnse MD  Attending Physician  [facility]  Division of Infectious Diseases  [phone]

## 2024-08-26 NOTE — PROGRESS NOTE ADULT - ASSESSMENT
Patient is a 75 y/o F w/ PMH of Breast Ca (s/p lumpectomy, s/p chemotherapy last session 1x year ago), CLL (followed at [facility], monitor), T2DM, and chronic RLE lymphedema who presents with 6x week of LLE swelling and redness, acutely worsened and with fevers for past 2x weeks. Physical exam remarkable for erythema/swelling w/ indistinct borders from L foot to L mid-calf, no point tenderness, no fluctuance. Labs WBC 23 (also, hx of CLL), CRP 15, Alk phos elevation. Patient with night sweats/fevers at home: more likely i/s/o of CLL. Ddx includes more likely cellulitis vs OM. L MR Foot ordered for r/o OM.      ##LLE Cellulitis vs OM    ** Recommendations not finalized until after rounds. Will update primary team with final recommendations at the time. Resident thoughts below **    Plan:   - per primary team, clinical improvement overnight s/p antibiotics.  - switch IV Ancef to 2g IV q8 hours while inpatient.   - f/u MR/CT L Foot for r/o OM and gas. No fluctuance on exam.   - Please obtain 2x sets of blood cultures  - Monitor fevers, clinical status, WBC  - Back pain/night sweats more likely 2.2 CLL vs LLE cellulitis/r/o OM.    Patient is a 75 y/o F w/ PMH of Breast Ca (s/p lumpectomy, s/p chemotherapy last session 1x year ago), [place] [facility]), T2DM, and chronic RLE lymphedema who presents with 6x week of LLE swelling and redness, acutely worsened and with fevers for past 2x weeks. Physical exam remarkable for erythema/swelling w/ indistinct borders from L foot to L mid-calf, no point tenderness, no fluctuance. Labs WBC 23 (also, hx of CLL), CRP 15, Alk phos elevation. Patient with night sweats/fevers at home: more likely i/s/o of CLL. Ddx includes more likely cellulitis vs OM. L MR Foot ordered for r/o OM.      ##LLE Cellulitis vs OM    ** Recommendations not finalized until after rounds. Will update primary team with final recommendations at the time. Resident thoughts below **    Plan:   - per primary team, clinical improvement overnight s/p antibiotics.  - will discuss IV Ancef 1g vs 2g IV q8 hours while inpatient.   - f/u MR/CT L Foot for r/o OM and gas. No fluctuance on exam.   - Will discuss 2x sets of blood cultures  - Monitor fevers, clinical status, WBC  - Back pain/night sweats more likely 2.2 CLL vs LLE cellulitis/r/o OM.    Patient is a 73 y/o F w/ PMH of Breast Ca (s/p lumpectomy, s/p chemotherapy last session 1x year ago), CLL (followed at [facility], monitor), T2DM, and chronic RLE lymphedema who presents with 6x week of LLE swelling and redness, acutely worsened and with fevers for past 2x weeks. Physical exam remarkable for erythema/swelling w/ indistinct borders from L foot to L mid-calf, no point tenderness, no fluctuance. Labs WBC 23 (also, hx of CLL), CRP 15, Alk phos elevation. Patient with night sweats/fevers at home: more likely i/s/o of CLL. Ddx includes more likely cellulitis vs OM. L MR Foot ordered for r/o OM.      ##LLE Cellulitis vs OM    ** Recommendations not finalized until after rounds. Will update primary team with final recommendations at the time. Resident thoughts below **    Plan:   - per primary team, clinical improvement overnight s/p antibiotics.  - IV Ancef 1g IV q8 hours while inpatient.   - f/u MR/CT L Foot for r/o OM and gas. No fluctuance on exam.   - Monitor fevers, clinical status, WBC  - Back pain/night sweats more likely 2.2 CLL vs LLE cellulitis/r/o OM.

## 2024-08-26 NOTE — PROGRESS NOTE ADULT - SUBJECTIVE AND OBJECTIVE BOX
HPI:    Patient is a 75 y/o F w/ PMH of Breast Ca (s/p lumpectomy, s/p chemotherapy last session 1x year ago), [place] [facility]), T2DM, and chronic RLE lymphedema who presents with 6x week of LLE swelling and redness, acutely worsened and with fevers for past 2x weeks. Patient reports first noticing LLE swelling and redness on July 1st; she, soon after, saw her PCP who attributed it to bug bite. Since, progression of the swelling and redness, more recently involving the foot and toes. She reports mild pain with ambulation on the L foot. For the past 2x weeks, she notes off and on fevers, Tmax 101 at home and night sweats. For the same duration, patient noticed new constant lower back pain. Sent in to the ED by . Of note, she denies trauma, LLE/foot wounds/ulcers, prolonged immobility, recent travel, outdoor activity, tick/insect bites, exposure to pets, sick contacts. She denies cough, SOB, CP, palpitations, or abdominal pain.    In the ED, patient afebrile, vitals wnl. WBC 34.8 (baseline 30s w/ hx of CLL), Alk Phos 154, CRP 15. Patient given 1x 2 mg Ancef IV and 1x 100 mg Doxy. Admitted for IV antibiotic treatment. Since admission, LLE duplex (-) for DVT, LLE Tib/Fib/3 View Foot XRs with no signs of OM. Patient ordered for CT L Foot for fluctuance noted on presentation and MR L Foot for r/o OM.    prior hospital charts reviewed [X]  primary team notes reviewed [X]  other consultant notes reviewed [X]    PAST MEDICAL & SURGICAL HISTORY:  Breast cancer (s/p lumpectomy & chemotherapy)   [place] [facility]  DM     Allergies  No Known Allergies    ANTIMICROBIALS (past 90 days)  MEDICATIONS  (STANDING):  ceFAZolin   IVPB   100 mL/Hr IV Intermittent (08-25-24 @ 16:06)    ceFAZolin   IVPB   100 mL/Hr IV Intermittent (08-26-24 @ 05:17)   100 mL/Hr IV Intermittent (08-25-24 @ 23:37)    doxycycline IVPB   100 mL/Hr IV Intermittent (08-25-24 @ 16:40)    ceFAZolin   IVPB 1000 every 8 hours    MEDICATIONS  (STANDING):  heparin   Injectable 5000 every 12 hours  senna 2 at bedtime    SOCIAL HISTORY:  Denies smoke/drink/drugs. Live alone at home; ambulates at baseline.    FAMILY HISTORY:    REVIEW OF SYSTEMS  [  ] ROS unobtainable because:    [X] All other systems negative except as noted below:	    Constitutional:  [X] fever [X] chills  [ ] weight loss  [ ] weakness  Skin:  [X] rash [ ] phlebitis	  Eyes: [ ] icterus [ ] pain  [ ] discharge	  ENMT: [ ] sore throat  [ ] thrush [ ] ulcers [ ] exudates  Respiratory: [ ] dyspnea [ ] hemoptysis [ ] cough [ ] sputum	  Cardiovascular:  [ ] chest pain [ ] palpitations [ ] edema	  Gastrointestinal:  [ ] nausea [ ] vomiting [ ] diarrhea [ ] constipation [ ] pain	  Genitourinary:  [ ] dysuria [ ] frequency [ ] hematuria [ ] discharge [ ] flank pain  [ ] incontinence  Musculoskeletal:  [ ] myalgias [ ] arthralgias [ ] arthritis  [X] back pain  Neurological:  [ ] headache [ ] seizures  [ ] confusion/altered mental status  Psychiatric:  [ ] anxiety [ ] depression	    Vital Signs Last 24 Hrs  T(F): 98.2 (08-26-24 @ 08:21), Max: 98.3 (08-25-24 @ 12:40)  Vital Signs Last 24 Hrs  HR: 83 (08-26-24 @ 08:21) (64 - 83)  BP: 112/66 (08-26-24 @ 08:21) (103/66 - 112/66)  RR: 18 (08-26-24 @ 08:21)  SpO2: 97% (08-26-24 @ 08:21) (95% - 100%)    PHYSICAL EXAM:  Constitutional: non-toxic, no distress  HEAD/EYES: anicteric, no conjunctival injection  ENT:  supple, no thrush  Cardiovascular:  normal S1, S2, no murmur, no edema  Respiratory:  clear BS bilaterally, no wheezes, no rales  GI:  soft, non-tender, normal bowel sounds  Musculoskeletal:  no spinal tenderness, no synovitis, normal ROM  Neurologic: awake and alert, normal strength, no focal findings  Skin: +Erythema w/ indistinct margins, swelling, from L foot and digits to mid-calf. Mild tenderness to palpation diffusely across L foot/calf. No fluctuance, ulcers, trauma noted. RLE with non-pitting edema i/s/o chronic RLE lymphedema.   Psychiatric:  awake, alert, appropriate mod               11.2   23.78 )-----------( 157      ( 26 Aug 2024 07:27 )             33.9   08-26    142  |  106  |  15  ----------------------------<  117<H>  4.5   |  23  |  0.88    Ca    8.6      26 Aug 2024 07:25    TPro  6.0  /  Alb  3.3  /  TBili  0.3  /  DBili  x   /  AST  24  /  ALT  68<H>  /  AlkPhos  154<H>  08-25      MICROBIOLOGY:    RADIOLOGY:      ACC: [id number] EXAM:  XR TIB FIB AP LAT 2 VIEWS LT   ORDERED BY: CIARAN ALMENDAREZ   ACC: [id number] EXAM:  XR FOOT COMP MIN 3 VIEWS LT   ORDERED BY: CIARAN ALMENDAREZ   PROCEDURE DATE:  08/25/2024      IMPRESSION:  No fracture or dislocation.  No definite radiographic evidence of osteomyelitis. If osteomyelitis is   suspected, an MRI could be obtained      ACC: [id number] EXAM:  DUPLEX SCAN EXT VEINS LOWER BI   ORDERED BY:   CIARAN ALMENDAREZ   PROCEDURE DATE:  08/25/2024      IMPRESSION:  No evidence of deep venous thrombosis in either lower extremity.

## 2024-08-27 LAB
A1C WITH ESTIMATED AVERAGE GLUCOSE RESULT: 6.7 % — HIGH (ref 4–5.6)
ERYTHROCYTE [SEDIMENTATION RATE] IN BLOOD: 12 MM/HR — SIGNIFICANT CHANGE UP (ref 0–20)
ESTIMATED AVERAGE GLUCOSE: 146 MG/DL — HIGH (ref 68–114)
GLUCOSE BLDC GLUCOMTR-MCNC: 123 MG/DL — HIGH (ref 70–99)
GLUCOSE BLDC GLUCOMTR-MCNC: 144 MG/DL — HIGH (ref 70–99)
GLUCOSE BLDC GLUCOMTR-MCNC: 147 MG/DL — HIGH (ref 70–99)
HCT VFR BLD CALC: 37.5 % — SIGNIFICANT CHANGE UP (ref 34.5–45)
HGB BLD-MCNC: 12.2 G/DL — SIGNIFICANT CHANGE UP (ref 11.5–15.5)
MCHC RBC-ENTMCNC: 29.8 PG — SIGNIFICANT CHANGE UP (ref 27–34)
MCHC RBC-ENTMCNC: 32.5 GM/DL — SIGNIFICANT CHANGE UP (ref 32–36)
MCV RBC AUTO: 91.5 FL — SIGNIFICANT CHANGE UP (ref 80–100)
NRBC # BLD: 0 /100 WBCS — SIGNIFICANT CHANGE UP (ref 0–0)
PLATELET # BLD AUTO: 170 K/UL — SIGNIFICANT CHANGE UP (ref 150–400)
RBC # BLD: 4.1 M/UL — SIGNIFICANT CHANGE UP (ref 3.8–5.2)
RBC # FLD: 14.6 % — HIGH (ref 10.3–14.5)
WBC # BLD: 28.83 K/UL — HIGH (ref 3.8–10.5)
WBC # FLD AUTO: 28.83 K/UL — HIGH (ref 3.8–10.5)

## 2024-08-27 PROCEDURE — 71260 CT THORAX DX C+: CPT | Mod: 26

## 2024-08-27 PROCEDURE — 74177 CT ABD & PELVIS W/CONTRAST: CPT | Mod: 26

## 2024-08-27 PROCEDURE — 73701 CT LOWER EXTREMITY W/DYE: CPT | Mod: 26,LT

## 2024-08-27 RX ADMIN — Medication 2 TABLET(S): at 22:20

## 2024-08-27 RX ADMIN — CEFAZOLIN SODIUM 100 MILLIGRAM(S): 2 INJECTION, SOLUTION INTRAVENOUS at 05:05

## 2024-08-27 RX ADMIN — CEFAZOLIN SODIUM 100 MILLIGRAM(S): 2 INJECTION, SOLUTION INTRAVENOUS at 13:17

## 2024-08-27 RX ADMIN — Medication 5000 UNIT(S): at 05:05

## 2024-08-27 RX ADMIN — Medication 5000 UNIT(S): at 18:37

## 2024-08-27 RX ADMIN — CEFAZOLIN SODIUM 100 MILLIGRAM(S): 2 INJECTION, SOLUTION INTRAVENOUS at 22:21

## 2024-08-27 NOTE — PROGRESS NOTE ADULT - SUBJECTIVE AND OBJECTIVE BOX
Patient is a 74y old  Female who presents with a chief complaint of arn  swelling/  pain (26 Aug 2024 10:07)       INTERVAL HPI/OVERNIGHT EVENTS:  Patient seen and evaluated at bedside.  Pt is resting comfortable in NAD. Denies N/V/F/C.    Allergies    No Known Allergies    Intolerances        Vital Signs Last 24 Hrs  T(C): 36.8 (27 Aug 2024 04:58), Max: 37.1 (26 Aug 2024 20:11)  T(F): 98.2 (27 Aug 2024 04:58), Max: 98.7 (26 Aug 2024 20:11)  HR: 66 (27 Aug 2024 04:58) (66 - 75)  BP: 132/72 (27 Aug 2024 04:58) (114/68 - 132/72)  BP(mean): --  RR: 18 (27 Aug 2024 04:58) (18 - 18)  SpO2: 96% (27 Aug 2024 04:58) (96% - 99%)    Parameters below as of 27 Aug 2024 04:58  Patient On (Oxygen Delivery Method): room air        LABS:                        12.2   28.83 )-----------( 170      ( 27 Aug 2024 07:11 )             37.5     08-26    142  |  106  |  15  ----------------------------<  117<H>  4.5   |  23  |  0.88    Ca    8.6      26 Aug 2024 07:25    TPro  6.0  /  Alb  3.3  /  TBili  0.3  /  DBili  x   /  AST  24  /  ALT  68<H>  /  AlkPhos  154<H>  08-25      Urinalysis Basic - ( 26 Aug 2024 07:25 )    Color: x / Appearance: x / SG: x / pH: x  Gluc: 117 mg/dL / Ketone: x  / Bili: x / Urobili: x   Blood: x / Protein: x / Nitrite: x   Leuk Esterase: x / RBC: x / WBC x   Sq Epi: x / Non Sq Epi: x / Bacteria: x      CAPILLARY BLOOD GLUCOSE      POCT Blood Glucose.: 144 mg/dL (27 Aug 2024 08:13)  POCT Blood Glucose.: 173 mg/dL (26 Aug 2024 20:51)  POCT Blood Glucose.: 132 mg/dL (26 Aug 2024 17:06)      Lower Extremity Physical Exam:  Vascular: DP/PT 2/4, B/L, CFT <3 seconds B/L, Left Temperature gradient warm to warm, Right Temperature gradient warm to cool  Neuro: Epicritic sensation intact to the level ofdigits, B/L.  Musculoskeletal/Ortho: manual muscle testing 5/5 in all four muscle groups  Skin: Left lower extremity erythema and +1 pitting edema from digits to proximal midshin, no fluctuance, no bogginess, no drainage, no open wounds. Right lower extremity edema, no open wounds, no acute signs of infection.       RADIOLOGY & ADDITIONAL TESTS:

## 2024-08-27 NOTE — PROGRESS NOTE ADULT - ASSESSMENT
74F presents for left lower extremity erythema  - Pt seen and evaluated  - Afebrile. WBC 28.83  -  Left lower extremity erythema and +1 pitting edema from digits to proximal midshin, no fluctuance, no bogginess, no drainage, no open wounds. Right lower extremity edema, no open wounds, no acute signs of infection.   - Right foot xray: no OM, no gas.   - Left foot MRI: no abscess, no OM  - Recommend continue antibiotics Ancef/Doxycycline  - ID recs, appreciated  - Recommend ordering a rheum panel   - Pod plan: local wound care pending erythema resolution  - Seen with attending

## 2024-08-27 NOTE — PROGRESS NOTE ADULT - SUBJECTIVE AND OBJECTIVE BOX
date of service: 08-27-24 @ 08:45  [facility]  REVIEW OF SYSTEMS:  CONSTITUTIONAL: No fever,  no  weight loss  ENT:  No  tinnitus,   no   vertigo  NECK: No pain or stiffness  RESPIRATORY: No cough, wheezing, chills or hemoptysis;    No Shortness of Breath  CARDIOVASCULAR: No chest pain, palpitations, dizziness  GASTROINTESTINAL: No abdominal or epigastric pain. No nausea, vomiting, or hematemesis; No diarrhea  No melena or hematochezia.  GENITOURINARY: No dysuria, frequency, hematuria, or incontinence  NEUROLOGICAL: No headaches  SKIN: No itching,  no   rash  LYMPH Nodes: No enlarged glands  ENDOCRINE: No heat or cold intolerance  MUSCULOSKELETAL: No joint pain or swelling  PSYCHIATRIC: No depression, anxiety  HEME/LYMPH: No easy bruising, or bleeding gums  ALLERGY AND IMMUNOLOGIC: No hives or eczema	    MEDICATIONS  (STANDING):  ceFAZolin   IVPB 1000 milliGRAM(s) IV Intermittent every 8 hours  heparin   Injectable 5000 Unit(s) SubCutaneous every 12 hours  senna 2 Tablet(s) Oral at bedtime    MEDICATIONS  (PRN):      Vital Signs Last 24 Hrs  T(C): 36.8 (27 Aug 2024 04:58), Max: 37.1 (26 Aug 2024 20:11)  T(F): 98.2 (27 Aug 2024 04:58), Max: 98.7 (26 Aug 2024 20:11)  HR: 66 (27 Aug 2024 04:58) (66 - 75)  BP: 132/72 (27 Aug 2024 04:58) (114/68 - 132/72)  BP(mean): --  RR: 18 (27 Aug 2024 04:58) (18 - 18)  SpO2: 96% (27 Aug 2024 04:58) (96% - 99%)    Parameters below as of 27 Aug 2024 04:58  Patient On (Oxygen Delivery Method): room air      CAPILLARY BLOOD GLUCOSE      POCT Blood Glucose.: 144 mg/dL (27 Aug 2024 08:13)  POCT Blood Glucose.: 173 mg/dL (26 Aug 2024 20:51)  POCT Blood Glucose.: 132 mg/dL (26 Aug 2024 17:06)    I&O's Summary    26 Aug 2024 07:01  -  27 Aug 2024 07:00  --------------------------------------------------------  IN: 290 mL / OUT: 0 mL / NET: 290 mL          Appearance: Normal	  HEENT:   Normal oral mucosa, PERRL, EOMI	  Lymphatic: No lymphadenopathy  Cardiovascular: Normal S1 S2, No JVD  Respiratory: Lungs clear to auscultation	  Gastrointestinal:  Soft, Non-tender, + BS	  Skin: No rash, No ecchymoses	  Extremities:  less  swelling    LABS:                        12.2   28.83 )-----------( 170      ( 27 Aug 2024 07:11 )             37.5     08-26    142  |  106  |  15  ----------------------------<  117<H>  4.5   |  23  |  0.88    Ca    8.6      26 Aug 2024 07:25    TPro  6.0  /  Alb  3.3  /  TBili  0.3  /  DBili  x   /  AST  24  /  ALT  68<H>  /  AlkPhos  154<H>  08-25          Urinalysis Basic - ( 26 Aug 2024 07:25 )    Color: x / Appearance: x / SG: x / pH: x  Gluc: 117 mg/dL / Ketone: x  / Bili: x / Urobili: x   Blood: x / Protein: x / Nitrite: x   Leuk Esterase: x / RBC: x / WBC x   Sq Epi: x / Non Sq Epi: x / Bacteria: x          08-25 @ 14:10  4.5  19              Consultant(s) Notes Reviewed:      Care Discussed with Consultants/Other Providers:

## 2024-08-27 NOTE — PROGRESS NOTE ADULT - ASSESSMENT
73 y/o female           h/o   [place]  [place] [facility]           DM,  ,, h/o   breast cancer s/p chemo  radiation,    mild  chronic lymphedema RLE             c/o   LLE pain swelling.  fevers,  erythema  for  6  weeks , worsened over last two weeks.         pain  worsened over the last two days.   sent  to er by   u/c,  5 day course of antibiotics on 7/1/24.      denies   cp/sob , prolonged sedentary period, trauma, fall, drainage     admitted with  left leg  pain/ distal  leg  swelling / redness  for  6  weeks.  denies   trauma/  falls.           was  at  [facility],  had  redness of   foot,   tx  with  anti    fungal  rx     cellulitis   distal  leg. / foot /  pt  walks  bare foot  at  home            on iv ancef            ct  foot/ r/o collection /  marked  swelling dorsum  foot             house  ID  /  podiatry  eval  called on  arrival             also  has  had  night  sweats   for past  week/  suspect  from  CLL.   /CT  c /ap,            DM         not on meds    at  home, per  daughter    Ca  breast,   right ,   about   5  yrs  ago / s/p  lumpectomy  .  with      mild  c/c right  arm  edema             onc [facility]    CLL,           f/p  at  [facility],  caio gaytan.  not  on rx          baseline  wbc  in 30, 000  range    dvt  ppx        doppler   legs ,  no  dvt     wbc  23,000  now,  on iv ancef/  awaiting  imaging   and  ID  . seen  by  podiatry     mri  foot, no  collection,  plnatar  fasvitiis      discussed   with  daughter /     pt  is  full  code

## 2024-08-28 ENCOUNTER — TRANSCRIPTION ENCOUNTER (OUTPATIENT)
Age: 74
End: 2024-08-28

## 2024-08-28 VITALS
DIASTOLIC BLOOD PRESSURE: 72 MMHG | WEIGHT: 202.83 LBS | SYSTOLIC BLOOD PRESSURE: 122 MMHG | RESPIRATION RATE: 18 BRPM | TEMPERATURE: 98 F | OXYGEN SATURATION: 94 % | HEART RATE: 70 BPM

## 2024-08-28 LAB
ANION GAP SERPL CALC-SCNC: 13 MMOL/L — SIGNIFICANT CHANGE UP (ref 5–17)
BUN SERPL-MCNC: 16 MG/DL — SIGNIFICANT CHANGE UP (ref 7–23)
CALCIUM SERPL-MCNC: 9.9 MG/DL — SIGNIFICANT CHANGE UP (ref 8.4–10.5)
CHLORIDE SERPL-SCNC: 102 MMOL/L — SIGNIFICANT CHANGE UP (ref 96–108)
CO2 SERPL-SCNC: 23 MMOL/L — SIGNIFICANT CHANGE UP (ref 22–31)
CREAT SERPL-MCNC: 0.82 MG/DL — SIGNIFICANT CHANGE UP (ref 0.5–1.3)
EGFR: 75 ML/MIN/1.73M2 — SIGNIFICANT CHANGE UP
GLUCOSE BLDC GLUCOMTR-MCNC: 118 MG/DL — HIGH (ref 70–99)
GLUCOSE BLDC GLUCOMTR-MCNC: 145 MG/DL — HIGH (ref 70–99)
GLUCOSE SERPL-MCNC: 156 MG/DL — HIGH (ref 70–99)
HCT VFR BLD CALC: 37 % — SIGNIFICANT CHANGE UP (ref 34.5–45)
HGB BLD-MCNC: 12 G/DL — SIGNIFICANT CHANGE UP (ref 11.5–15.5)
MCHC RBC-ENTMCNC: 29.4 PG — SIGNIFICANT CHANGE UP (ref 27–34)
MCHC RBC-ENTMCNC: 32.4 GM/DL — SIGNIFICANT CHANGE UP (ref 32–36)
MCV RBC AUTO: 90.7 FL — SIGNIFICANT CHANGE UP (ref 80–100)
NRBC # BLD: 0 /100 WBCS — SIGNIFICANT CHANGE UP (ref 0–0)
PLATELET # BLD AUTO: 168 K/UL — SIGNIFICANT CHANGE UP (ref 150–400)
POTASSIUM SERPL-MCNC: 4.2 MMOL/L — SIGNIFICANT CHANGE UP (ref 3.5–5.3)
POTASSIUM SERPL-SCNC: 4.2 MMOL/L — SIGNIFICANT CHANGE UP (ref 3.5–5.3)
RBC # BLD: 4.08 M/UL — SIGNIFICANT CHANGE UP (ref 3.8–5.2)
RBC # FLD: 14.5 % — SIGNIFICANT CHANGE UP (ref 10.3–14.5)
SODIUM SERPL-SCNC: 138 MMOL/L — SIGNIFICANT CHANGE UP (ref 135–145)
WBC # BLD: 25.23 K/UL — HIGH (ref 3.8–10.5)
WBC # FLD AUTO: 25.23 K/UL — HIGH (ref 3.8–10.5)

## 2024-08-28 PROCEDURE — 82435 ASSAY OF BLOOD CHLORIDE: CPT

## 2024-08-28 PROCEDURE — 93970 EXTREMITY STUDY: CPT

## 2024-08-28 PROCEDURE — 96375 TX/PRO/DX INJ NEW DRUG ADDON: CPT

## 2024-08-28 PROCEDURE — 71260 CT THORAX DX C+: CPT | Mod: MC

## 2024-08-28 PROCEDURE — 99285 EMERGENCY DEPT VISIT HI MDM: CPT | Mod: 25

## 2024-08-28 PROCEDURE — 87640 STAPH A DNA AMP PROBE: CPT

## 2024-08-28 PROCEDURE — 86140 C-REACTIVE PROTEIN: CPT

## 2024-08-28 PROCEDURE — 84132 ASSAY OF SERUM POTASSIUM: CPT

## 2024-08-28 PROCEDURE — 74177 CT ABD & PELVIS W/CONTRAST: CPT | Mod: MC

## 2024-08-28 PROCEDURE — 85018 HEMOGLOBIN: CPT

## 2024-08-28 PROCEDURE — 82330 ASSAY OF CALCIUM: CPT

## 2024-08-28 PROCEDURE — 87641 MR-STAPH DNA AMP PROBE: CPT

## 2024-08-28 PROCEDURE — A9585: CPT

## 2024-08-28 PROCEDURE — 99232 SBSQ HOSP IP/OBS MODERATE 35: CPT

## 2024-08-28 PROCEDURE — 84295 ASSAY OF SERUM SODIUM: CPT

## 2024-08-28 PROCEDURE — 96374 THER/PROPH/DIAG INJ IV PUSH: CPT

## 2024-08-28 PROCEDURE — 80048 BASIC METABOLIC PNL TOTAL CA: CPT

## 2024-08-28 PROCEDURE — 73590 X-RAY EXAM OF LOWER LEG: CPT

## 2024-08-28 PROCEDURE — 36415 COLL VENOUS BLD VENIPUNCTURE: CPT

## 2024-08-28 PROCEDURE — 73701 CT LOWER EXTREMITY W/DYE: CPT | Mod: MC

## 2024-08-28 PROCEDURE — 83036 HEMOGLOBIN GLYCOSYLATED A1C: CPT

## 2024-08-28 PROCEDURE — 73630 X-RAY EXAM OF FOOT: CPT

## 2024-08-28 PROCEDURE — 85652 RBC SED RATE AUTOMATED: CPT

## 2024-08-28 PROCEDURE — 85025 COMPLETE CBC W/AUTO DIFF WBC: CPT

## 2024-08-28 PROCEDURE — 80053 COMPREHEN METABOLIC PANEL: CPT

## 2024-08-28 PROCEDURE — 82803 BLOOD GASES ANY COMBINATION: CPT

## 2024-08-28 PROCEDURE — 83605 ASSAY OF LACTIC ACID: CPT

## 2024-08-28 PROCEDURE — 82962 GLUCOSE BLOOD TEST: CPT

## 2024-08-28 PROCEDURE — 82947 ASSAY GLUCOSE BLOOD QUANT: CPT

## 2024-08-28 PROCEDURE — 85014 HEMATOCRIT: CPT

## 2024-08-28 PROCEDURE — 85027 COMPLETE CBC AUTOMATED: CPT

## 2024-08-28 PROCEDURE — 73720 MRI LWR EXTREMITY W/O&W/DYE: CPT | Mod: MC

## 2024-08-28 RX ORDER — ZOLPIDEM TARTRATE 5 MG/1
5 TABLET, FILM COATED ORAL ONCE
Refills: 0 | Status: DISCONTINUED | OUTPATIENT
Start: 2024-08-28 | End: 2024-08-28

## 2024-08-28 RX ORDER — CEPHALEXIN 500 MG
1 CAPSULE ORAL
Qty: 28 | Refills: 0
Start: 2024-08-28 | End: 2024-09-03

## 2024-08-28 RX ORDER — CEPHALEXIN 500 MG
500 CAPSULE ORAL
Refills: 0 | Status: DISCONTINUED | OUTPATIENT
Start: 2024-08-28 | End: 2024-08-28

## 2024-08-28 RX ORDER — SENNA 187 MG
2 TABLET ORAL
Qty: 0 | Refills: 0 | DISCHARGE
Start: 2024-08-28

## 2024-08-28 RX ADMIN — CEFAZOLIN SODIUM 100 MILLIGRAM(S): 2 INJECTION, SOLUTION INTRAVENOUS at 06:02

## 2024-08-28 RX ADMIN — Medication 500 MILLIGRAM(S): at 12:06

## 2024-08-28 RX ADMIN — ZOLPIDEM TARTRATE 5 MILLIGRAM(S): 5 TABLET, FILM COATED ORAL at 00:44

## 2024-08-28 RX ADMIN — Medication 5000 UNIT(S): at 06:02

## 2024-08-28 NOTE — DISCHARGE NOTE NURSING/CASE MANAGEMENT/SOCIAL WORK - NSDCPEFALRISK_GEN_ALL_CORE
For information on Fall & Injury Prevention, visit: [email or web address] OR  [email or web address] OR  https://www.cdc.gov/steadi/patient.html

## 2024-08-28 NOTE — CONSULT NOTE ADULT - ASSESSMENT
74F presents for left lower extremity erythema  - Pt seen and evaluated  - WBC 34.85, ESR pending, CRP 1.5  - Left lower extremity erythema and edema from digits to proximal midshin, no open wounds. Right lower extremity edema, no open wounds, no acute signs of infection.   - Ordered ESR  - Recommend continue antibiotics Ancef/Doxycycline per ED physician  - Recommend ID consult  - Pod plan: d/c pending erythema resolution
75 y/o F PMHx CLL being monitored with [facility], DM, breast cancer s/p chemo radiation  now, chronic lymphedema RLE presenting to the ED with LLE pain swelling and erythema for 6 weeks.    Hx Breast ca  --currently, ISMAEL  --s/p L lumpectomy 2018  --s/p R Lumpectomy 1982    #CLL  --follows with Dr Lange of [facility]  --pt w/ known pelvic/inguinal lymphadenopathy. Will have [facility] review imaging done inpatient on 8/27  --on surveillance, ongoing care after discharge    #LUPE DEVRIES  #r/o cellulitis  --on PO abx, pt afebrile   --L foot xRay and MRI w/o OM  --Podiatry and ID following    Patient cleared for discharge from Heme/onc perspective    Gabrielle Lew NP  Hematology/ Oncology  [facility]  [phone] (office)  [phone] (alt office)  Evenings and weekends please call MD on call or office

## 2024-08-28 NOTE — PROGRESS NOTE ADULT - ASSESSMENT
73 y/o female           h/o   CLL  being monitored  with [facility],           DM,  ,, h/o   breast cancer s/p chemo  radiation,    mild  chronic lymphedema RLE             c/o   LLE pain swelling.  fevers,  erythema  for  6  weeks , worsened over last two weeks.         pain  worsened over the last two days.   sent  to er by   u/c,  5 day course of antibiotics on 7/1/24.      denies   cp/sob , prolonged sedentary period, trauma, fall, drainage     admitted with  left leg  pain/ distal  leg  swelling / redness  for  6  weeks.  denies   trauma/  falls.           was  at  [facility],  had  redness of   foot,   tx  with  anti    fungal  rx     cellulitis   distal  leg. / foot /  pt  walks  bare foot  at  home            on iv ancef            ct  foot/ r/o collection /  marked  swelling dorsum  foot             house  ID  /  podiatry  eval  called on  arrival             also  has  had  night  sweats   for past  week/  suspect  from  CLL.   /CT  c /ap,            DM         not on meds    at  home, per  daughter    Ca  breast,   right ,   about   5  yrs  ago / s/p  lumpectomy  .  with      mild  c/c right  arm  edema             onc [facility]    CLL,           f/p  at  [facility],  d r lucila.  not  on rx          baseline  wbc  in 30, 000  range       doppler   legs ,  no  dvt     wbc  23,000  now,  on iv ancef/  awaiting  imaging   and  ID  . seen  by  podiatry     CT scans  noted/  pt /  lymphadenopathy/  c/w  he r dx  xof CLL ,   and pt to d/p  with [facility]    mri  foot, no  collection,  plantar  facsitis.      foot   swelling has improved     await  id     f/p,    dr salazar,  to  define  ab course/ then. may  start   d/c  plans        pt  is  full  code        rad< from: CT Chest w/ IV Cont (08.27.24 @ 12:26) >  IMPRESSION:  Enlarged pelvic and inguinal lymph nodes.  Focal hepatic steatosis.  Several small pulmonary nodules. May obtain follow-up in one year as   clinically indicated  --- End of Report ---                                    73 y/o female           h/o   CLL  being monitored  with [facility],           DM,  ,, h/o   breast cancer s/p chemo  radiation,    mild  chronic lymphedema RLE             c/o   LLE pain swelling.  fevers,  erythema  for  6  weeks , worsened over last two weeks.         pain  worsened over the last two days.   sent  to er by   u/c,  5 day course of antibiotics on 7/1/24.      denies   cp/sob , prolonged sedentary period, trauma, fall, drainage     admitted with  left leg  pain/ distal  leg  swelling / redness  for  6  weeks.  denies   trauma/  falls.           was  at  [facility],  had  redness of   foot,   tx  with  anti    fungal  rx     cellulitis   distal  leg. / foot /  pt  walks  bare foot  at  home            on iv ancef            ct  foot/ r/o collection /  marked  swelling dorsum  foot             house  ID  /  podiatry  eval  called on  arrival             also  has  had  night  sweats   for past  week/  suspect  from  CLL.   /CT  c /ap,            DM         not on meds    at  home, per  daughter    Ca  breast,   right ,   about   5  yrs  ago / s/p  lumpectomy  .  with      mild  c/c right  arm  edema             onc [facility]    CLL,           f/p  at  [facility],  d r lucila.  not  on rx          baseline  wbc  in 30, 000  range       doppler   legs ,  no  dvt     wbc  23,000  now,  on iv ancef/  awaiting  imaging   and  ID  . seen  by  podiatry     CT scans  noted/  pt /  lymphadenopathy/  c/w  he r dx  xof CLL ,   and pt to d/p  with [facility]    mri  foot, no  collection,  plantar  facsitis.      foot   swelling has improved     await  id     f/p,    dr salazar,  to  define  ab course/ then. may  start   d/c  plans     called  meghan romero times/ phone  hanging up       pt  is  full  code        rad< from: CT Chest w/ IV Cont (08.27.24 @ 12:26) >  IMPRESSION:  Enlarged pelvic and inguinal lymph nodes.  Focal hepatic steatosis.  Several small pulmonary nodules. May obtain follow-up in one year as   clinically indicated  --- End of Report ---

## 2024-08-28 NOTE — PROGRESS NOTE ADULT - REASON FOR ADMISSION
arn  swelling/  pain

## 2024-08-28 NOTE — PROGRESS NOTE ADULT - ASSESSMENT
74F presents for left lower extremity erythema  - Pt seen and evaluated  - Afebrile. WBC 28.83  -  Left lower extremity improved erythema and +1 pitting edema from digits to proximal midshin, no fluctuance, no bogginess, no drainage, no open wounds. Right lower extremity edema, no open wounds, no acute signs of infection.   - Right foot xray: no OM, no gas.   - Left foot MRI: no abscess, no OM  - Recommend continue antibiotics Ancef/Doxycycline  - ID recs, appreciated  - No acute podiatric surgical intervention at this time  - Patient is stable for discharge from podiatry standpoint  - Wound care and follow up information can be found in discharge provider note  - Seen with attending

## 2024-08-28 NOTE — PROGRESS NOTE ADULT - PROVIDER SPECIALTY LIST ADULT
Internal Medicine
Internal Medicine
Podiatry
Podiatry
Infectious Disease
Infectious Disease
Podiatry
Internal Medicine

## 2024-08-28 NOTE — DISCHARGE NOTE NURSING/CASE MANAGEMENT/SOCIAL WORK - NSDCFUADDAPPT_GEN_ALL_CORE_FT
Podiatry Discharge Instructions:  Follow up: Please follow up with Dr. Hendrickson within 1 week of discharge from the hospital, please call [phone] for appointment and discuss that you recently were seen in the hospital.  Wound Care: n/a  Weight bearing: Please weight bear as tolerated.  Antibiotics: Please continue as instructed.

## 2024-08-28 NOTE — PROGRESS NOTE ADULT - SUBJECTIVE AND OBJECTIVE BOX
date of service: 08-28-24 @ 09:31  [facility]  REVIEW OF SYSTEMS:  CONSTITUTIONAL: No fever,  no  weight loss  ENT:  No  tinnitus,   no   vertigo  NECK: No pain or stiffness  RESPIRATORY: No cough, wheezing, chills or hemoptysis;    No Shortness of Breath  CARDIOVASCULAR: No chest pain, palpitations, dizziness  GASTROINTESTINAL: No abdominal or epigastric pain. No nausea, vomiting, or hematemesis; No diarrhea  No melena or hematochezia.  GENITOURINARY: No dysuria, frequency, hematuria, or incontinence  NEUROLOGICAL: No headaches  SKIN: No itching,  no   rash  LYMPH Nodes: No enlarged glands  ENDOCRINE: No heat or cold intolerance  MUSCULOSKELETAL: No joint pain or swelling  PSYCHIATRIC: No depression, anxiety  HEME/LYMPH: No easy bruising, or bleeding gums  ALLERGY AND IMMUNOLOGIC: No hives or eczema	    MEDICATIONS  (STANDING):  ceFAZolin   IVPB 1000 milliGRAM(s) IV Intermittent every 8 hours  heparin   Injectable 5000 Unit(s) SubCutaneous every 12 hours  senna 2 Tablet(s) Oral at bedtime    MEDICATIONS  (PRN):      Vital Signs Last 24 Hrs  T(C): 37.3 (28 Aug 2024 05:32), Max: 37.3 (28 Aug 2024 05:32)  T(F): 99.1 (28 Aug 2024 05:32), Max: 99.1 (28 Aug 2024 05:32)  HR: 70 (28 Aug 2024 05:32) (65 - 73)  BP: 122/76 (28 Aug 2024 05:32) (122/76 - 138/73)  BP(mean): --  RR: 18 (28 Aug 2024 05:32) (18 - 18)  SpO2: 97% (28 Aug 2024 05:32) (97% - 97%)    Parameters below as of 28 Aug 2024 05:32  Patient On (Oxygen Delivery Method): room air      CAPILLARY BLOOD GLUCOSE      POCT Blood Glucose.: 145 mg/dL (28 Aug 2024 08:47)  POCT Blood Glucose.: 147 mg/dL (27 Aug 2024 20:59)  POCT Blood Glucose.: 123 mg/dL (27 Aug 2024 16:54)    I&O's Summary    27 Aug 2024 07:01  -  28 Aug 2024 07:00  --------------------------------------------------------  IN: 480 mL / OUT: 0 mL / NET: 480 mL          Appearance: Normal	  HEENT:   Normal oral mucosa, PERRL, EOMI	  Lymphatic: No lymphadenopathy  Cardiovascular: Normal S1 S2, No JVD  Respiratory: Lungs clear to auscultation	  Gastrointestinal:  Soft, Non-tender, + BS	  Skin: No rash, No ecchymoses	  Extremities:     LABS:                        12.0   25.23 )-----------( 168      ( 28 Aug 2024 07:38 )             37.0     08-28    138  |  102  |  16  ----------------------------<  156<H>  4.2   |  23  |  0.82    Ca    9.9      28 Aug 2024 07:41            Urinalysis Basic - ( 28 Aug 2024 07:41 )    Color: x / Appearance: x / SG: x / pH: x  Gluc: 156 mg/dL / Ketone: x  / Bili: x / Urobili: x   Blood: x / Protein: x / Nitrite: x   Leuk Esterase: x / RBC: x / WBC x   Sq Epi: x / Non Sq Epi: x / Bacteria: x                      Consultant(s) Notes Reviewed:      Care Discussed with Consultants/Other Providers:

## 2024-08-28 NOTE — PROGRESS NOTE ADULT - SUBJECTIVE AND OBJECTIVE BOX
[facility]  Division of Infectious Diseases  [phone]    Name: GOLD REILLY  Age: 74y  Gender: Female  MRN: [id number]    Interval History--  Notes reviewed.     Past Medical History--  Breast cancer        For details regarding the patient's social history, family history, and other miscellaneous elements, please refer the initial infectious diseases consultation and/or the admitting history and physical examination for this admission.    Allergies    No Known Allergies    Intolerances        Medications--  Antibiotics:    Immunologic:    Other:  heparin   Injectable  senna      Review of Systems--  A 10-point review of systems was obtained.     Pertinent positives and negatives--  Constitutional: No fevers. No Chills. No Rigors.   Cardiovascular: No chest pain. No palpitations.  Respiratory: No shortness of breath. No cough.  Gastrointestinal: No nausea or vomiting. No diarrhea or constipation.   Psychiatric: Pleasant. Appropriate affect.    Review of systems otherwise negative except as previously noted.    Physical Examination--  Vital Signs: T(F): 99.1 (08-28-24 @ 05:32), Max: 99.1 (08-28-24 @ 05:32)  HR: 70 (08-28-24 @ 05:32)  BP: 122/76 (08-28-24 @ 05:32)  RR: 18 (08-28-24 @ 05:32)  SpO2: 97% (08-28-24 @ 05:32)  Wt(kg): --  General: Nontoxic-appearing Female in no acute distress.  HEENT: AT/NC. PERRL. EOMI. Anicteric. Conjunctiva pink and moist. Oropharynx clear. Dentition fair.  Neck: Not rigid. No sense of mass.  Nodes: None palpable.  Lungs: Clear bilaterally without rales, wheezing or rhonchi  Heart: Regular rate and rhythm. No Murmur. No rub. No gallop. No palpable thrill.  Abdomen: Bowel sounds present and normoactive. Soft. Nondistended. Nontender. No sense of mass. No organomegaly.  Back: No spinal tenderness. No costovertebral angle tenderness.   Extremities: No cyanosis or clubbing. No edema.   Skin: Warm. Dry. Good turgor. No rash. No vasculitic stigmata.  Psychiatric: Appropriate affect and mood for situation.         Laboratory Studies--  CBC                        12.0   25.23 )-----------( 168      ( 28 Aug 2024 07:38 )             37.0       Chemistries  08-28    138  |  102  |  16  ----------------------------<  156<H>  4.2   |  23  |  0.82    Ca    9.9      28 Aug 2024 07:41        Culture Data           [facility]  Division of Infectious Diseases  [phone]    Name: GOLD REILLY  Age: 74y  Gender: Female  MRN: [id number]    Interval History--  Notes reviewed. Seen earlier today. Much improved. MRI results reviewed with patient. Denies fevers chills or rigors.    Past Medical History--  Breast cancer        For details regarding the patient's social history, family history, and other miscellaneous elements, please refer the initial infectious diseases consultation and/or the admitting history and physical examination for this admission.    Allergies    No Known Allergies    Intolerances        Medications--  Antibiotics:    Immunologic:    Other:  heparin   Injectable  senna      Review of Systems--  A 10-point review of systems was obtained.   Review of systems otherwise negative except as previously noted.    Physical Examination--  Vital Signs: T(F): 99.1 (08-28-24 @ 05:32), Max: 99.1 (08-28-24 @ 05:32)  HR: 70 (08-28-24 @ 05:32)  BP: 122/76 (08-28-24 @ 05:32)  RR: 18 (08-28-24 @ 05:32)  SpO2: 97% (08-28-24 @ 05:32)  Wt(kg): --  General: Nontoxic-appearing Female in no acute distress.  HEENT: AT/NC. Anicteric. Conjunctiva pink and moist. Oropharynx clear..  Neck: Not rigid. No sense of mass.  Nodes: None palpable.  Lungs: Clear bilaterally   Heart: Regular rate and rhythm.   Abdomen: Bowel sounds present and normoactive. Soft. Nondistended. Nontender.   Extremities: No cyanosis or clubbing. Mild residual edema.  Near complete resolution of erythema.  No tenderness.  No increased calor.   Skin: Warm. Dry. Good turgor. No rash. No vasculitic stigmata.  Psychiatric: Appropriate affect and mood for situation.         Laboratory Studies--  CBC                        12.0   25.23 )-----------( 168      ( 28 Aug 2024 07:38 )             37.0       Chemistries  08-28    138  |  102  |  16  ----------------------------<  156<H>  4.2   |  23  |  0.82    Ca    9.9      28 Aug 2024 07:41        Culture Data  None      < from: MR Foot w/wo IV Cont, Left (08.26.24 @ 17:21) >  IMPRESSION:  No evidence of osteomyelitis. Chronic findings as above.      < end of copied text >

## 2024-08-28 NOTE — PROGRESS NOTE ADULT - ASSESSMENT
Patient is a 73 y/o F w/ PMH of Breast Ca (s/p lumpectomy, s/p chemotherapy last session 1x year ago), CLL (followed at [facility], monitor), T2DM, and chronic RLE lymphedema who presents with 6x week of LLE swelling and redness, acutely worsened and with fevers for past 2x weeks. Physical exam remarkable for erythema/swelling w/ indistinct borders from L foot to L mid-calf, no point tenderness, no fluctuance. Labs WBC 23 (also, hx of CLL), CRP 15, Alk phos elevation. Patient with night sweats/fevers at home: more likely i/s/o of CLL. Ddx includes more likely cellulitis vs OM. L MR Foot ordered for r/o OM.        8/28: Marked improvement in exam.  No concern of uncontrolled infection.  Reviewed with patient.  Red flags addressed.  All questions answered to the best my ability.  Antibiotics discussed and potential adverse events addressed.    Suggestions  No objection to discharge  Transition to Keflex 500 mg p.o. 3 times per day  Discussed with Dr. Hopkins earlier today  I will sign off at this time  Thank you for the courtesy of this referral    Charan Barnes MD  Attending Physician  [facility] [place]  Division of Infectious Diseases  [phone]   Patient is a 75 y/o F w/ PMH of Breast Ca (s/p lumpectomy, s/p chemotherapy last session 1x year ago), CLL (followed at [facility], monitor), T2DM, and chronic RLE lymphedema who presents with 6x week of LLE swelling and redness, acutely worsened and with fevers for past 2x weeks. Physical exam remarkable for erythema/swelling w/ indistinct borders from L foot to L mid-calf, no point tenderness, no fluctuance. Labs WBC 23 (also, hx of CLL), CRP 15, Alk phos elevation. Patient with night sweats/fevers at home: more likely i/s/o of CLL. Ddx includes more likely cellulitis vs OM. L MR Foot ordered for r/o OM.        8/28: Marked improvement in exam.  No concern of uncontrolled infection.  Reviewed with patient.  Red flags addressed.  All questions answered to the best my ability.  Antibiotics discussed and potential adverse events addressed.    Suggestions  No objection to discharge  Transition to Keflex 500 mg p.o. 4 times per day  Discussed with Dr. Hopkins earlier today  I will sign off at this time  Thank you for the courtesy of this referral    Charan aBrnes MD  Attending Physician  [facility]  Division of Infectious Diseases  [phone]

## 2024-08-28 NOTE — PROGRESS NOTE ADULT - SUBJECTIVE AND OBJECTIVE BOX
Patient is a 74y old  Female who presents with a chief complaint of arn  swelling/  pain (28 Aug 2024 11:05)       INTERVAL HPI/OVERNIGHT EVENTS:  Patient seen and evaluated at bedside.  Pt is resting comfortable in NAD. Denies N/V/F/C.    Allergies    No Known Allergies    Intolerances        Vital Signs Last 24 Hrs  T(C): 37.3 (28 Aug 2024 05:32), Max: 37.3 (28 Aug 2024 05:32)  T(F): 99.1 (28 Aug 2024 05:32), Max: 99.1 (28 Aug 2024 05:32)  HR: 70 (28 Aug 2024 05:32) (65 - 73)  BP: 122/76 (28 Aug 2024 05:32) (122/76 - 138/73)  BP(mean): --  RR: 18 (28 Aug 2024 05:32) (18 - 18)  SpO2: 97% (28 Aug 2024 05:32) (97% - 97%)    Parameters below as of 28 Aug 2024 05:32  Patient On (Oxygen Delivery Method): room air        LABS:                        12.0   25.23 )-----------( 168      ( 28 Aug 2024 07:38 )             37.0     08-28    138  |  102  |  16  ----------------------------<  156<H>  4.2   |  23  |  0.82    Ca    9.9      28 Aug 2024 07:41        Urinalysis Basic - ( 28 Aug 2024 07:41 )    Color: x / Appearance: x / SG: x / pH: x  Gluc: 156 mg/dL / Ketone: x  / Bili: x / Urobili: x   Blood: x / Protein: x / Nitrite: x   Leuk Esterase: x / RBC: x / WBC x   Sq Epi: x / Non Sq Epi: x / Bacteria: x      CAPILLARY BLOOD GLUCOSE      POCT Blood Glucose.: 145 mg/dL (28 Aug 2024 08:47)  POCT Blood Glucose.: 147 mg/dL (27 Aug 2024 20:59)  POCT Blood Glucose.: 123 mg/dL (27 Aug 2024 16:54)      Lower Extremity Physical Exam:  Vascular: DP/PT 2/4, B/L, CFT <3 seconds B/L, Left Temperature gradient warm to warm, Right Temperature gradient warm to cool  Neuro: Epicritic sensation intact to the level ofdigits, B/L.  Musculoskeletal/Ortho: manual muscle testing 5/5 in all four muscle groups  Skin: -  Left lower extremity improved erythema and +1 pitting edema from digits to proximal midshin, no fluctuance, no bogginess, no drainage, no open wounds. Right lower extremity edema, no open wounds, no acute signs of infection.     RADIOLOGY & ADDITIONAL TESTS:

## 2024-08-28 NOTE — DISCHARGE NOTE NURSING/CASE MANAGEMENT/SOCIAL WORK - PATIENT PORTAL LINK FT
You can access the FollowMyHealth Patient Portal offered by [facility] by registering at the following website: [email or web address]. By joining [employer]’s FollowMyHealth portal, you will also be able to view your health information using other applications (apps) compatible with our system.

## 2024-08-28 NOTE — CONSULT NOTE ADULT - SUBJECTIVE AND OBJECTIVE BOX
Reason for consult: CLL    HPI:  ·  75 y/o female         PMHx CLL being monitored with [facility], DM, breast cancer s/p chemo radiation  now, chronic lymphedema RLE           presenting to the ED with LLE pain swelling and erythema  for  6  weeks          pt   reported the symptoms started 6 weeks ago with fevers and worsened over last two weeks.       pain  worsened over the last two days.         sent  to e r by   u/c,  pt  was on a  5 day course of antibiotics on 7/1/24.         denies   cp/sob , prolonged sedentary period, trauma, fall, drainage   (25 Aug 2024 16:16)    Hematology/Oncology called to see patient who follows with Dr Lange of  [facility] for management of CLL    PAST MEDICAL & SURGICAL HISTORY:  Breast cancer          FAMILY HISTORY:      Alochol: Denied  Smoking: Nonsmoker  Drug Use: Denied  Marital Status:         Allergies    No Known Allergies    Intolerances        MEDICATIONS  (STANDING):  cephalexin 500 milliGRAM(s) Oral four times a day  heparin   Injectable 5000 Unit(s) SubCutaneous every 12 hours  senna 2 Tablet(s) Oral at bedtime    MEDICATIONS  (PRN):      ROS  No fever, sweats, chills  No epistaxis, HA, sore throat  No CP, SOB, cough, sputum  No n/v/d, abd pain, melena, hematochezia  No edema  No rash  No anxiety  No back pain, joint pain  No bleeding, bruising  No dysuria, hematuria    T(C): 36.7 (08-28-24 @ 11:52), Max: 37.3 (08-28-24 @ 05:32)  HR: 70 (08-28-24 @ 11:52) (70 - 73)  BP: 122/72 (08-28-24 @ 11:52) (122/72 - 138/73)  RR: 18 (08-28-24 @ 11:52) (18 - 18)  SpO2: 94% (08-28-24 @ 11:52) (94% - 97%)  Wt(kg): --    PE  NAD  Awake, alert  Anicteric, MMM  RRR  CTAB  Abd soft, NT, ND  No c/c/e  No rash grossly  FROM                          12.0   25.23 )-----------( 168      ( 28 Aug 2024 07:38 )             37.0       08-28    138  |  102  |  16  ----------------------------<  156<H>  4.2   |  23  |  0.82    Ca    9.9      28 Aug 2024 07:41    ACC: [id number] EXAM:  CT ABDOMEN AND PELVIS IC   ORDERED BY: IRVIN QUIÑONES     ACC: [id number] EXAM:  CT CHEST IC   ORDERED BY: IRVIN QUIÑONES     PROCEDURE DATE:  08/27/2024          INTERPRETATION:  CLINICAL INFORMATION: 74-year-old female with CLL and   night sweats. Admitted with left lower extremity cellulitis. History   right breast cancer status post lumpectomy with mild residual right arm   edema    COMPARISON: None.    CONTRAST/COMPLICATIONS:  IV Contrast: Omnipaque 350  90 cc administered   10 cc discarded  Oral Contrast: NONE  Complications: None reported at time of study completion    PROCEDURE:  CT of the Chest, Abdomen and Pelvis was performed.  Sagittal and coronal reformats were performed.    FINDINGS:  CHEST:  LUNGS AND LARGE AIRWAYS: Patent central airways. Pulmonary nodules   measuring up to 6 mm right upper lobe image 22, right lower lobe images   73 and 77 of series 3.  PLEURA: No pleural effusion.  VESSELS: Within normal limits.  HEART: Heart size is normal. No pericardial effusion. Coronary artery and   aortic valve calcification.  MEDIASTINUM AND OMAR: No lymphadenopathy.  CHEST WALL AND LOWER NECK: Within normal limits.    ABDOMEN AND PELVIS:  LIVER: Several subcentimeter hypodensities at liver dome. Scattered   geographic hypodense zones suggesting focal steatosis.  BILE DUCTS: Normal caliber.  GALLBLADDER: Within normal limits.  SPLEEN: Within normal limits.  PANCREAS: Within normal limits.  ADRENALS: Within normal limits.  KIDNEYS/URETERS: 3.2 cm cyst with punctate calcifications    BLADDER: Within normal limits.  REPRODUCTIVE ORGANS: Uterus and adnexa within normal limits.    BOWEL: No bowel obstruction. Appendix normal.  PERITONEUM/RETROPERITONEUM: Within normal limits  VESSELS: Within normal limits.  LYMPH NODES: Multiple small retroperitoneal and pelvic lymph nodes.   Prominent bilateral obturator lymph nodes measuring up to 3.8 x 1.6 cm on   the left. Prominent left inguinal lymph nodes measuring up to 3.2 x 2.2   centimeters.  ABDOMINAL WALL: Within normal limits.  BONES: Degenerative change.    IMPRESSION:  Enlarged pelvic and inguinal lymph nodes.    Focal hepatic steatosis.    Several small pulmonary nodules. May obtain follow-up in one year as   clinically indicated         Reason for consult: CLL    HPI:  ·  75 y/o female         PMHx CLL being monitored with [facility], DM, breast cancer s/p chemo radiation  now, chronic lymphedema RLE           presenting to the ED with LLE pain swelling and erythema  for  6  weeks          pt   reported the symptoms started 6 weeks ago with fevers and worsened over last two weeks.       pain  worsened over the last two days.         sent  to e r by   u/c,  pt  was on a  5 day course of antibiotics on 7/1/24.         denies   cp/sob , prolonged sedentary period, trauma, fall, drainage   (25 Aug 2024 16:16)    Hematology/Oncology called to see patient who follows with Dr Lange of  [facility] for management of CLL    PAST MEDICAL & SURGICAL HISTORY:  Breast cancer          FAMILY HISTORY:      Alochol: Denied  Smoking: Nonsmoker  Drug Use: Denied  Marital Status:         Allergies    No Known Allergies    Intolerances        MEDICATIONS  (STANDING):  cephalexin 500 milliGRAM(s) Oral four times a day  heparin   Injectable 5000 Unit(s) SubCutaneous every 12 hours  senna 2 Tablet(s) Oral at bedtime    MEDICATIONS  (PRN):      ROS  No fever, sweats, chills  No epistaxis, HA, sore throat  No CP, SOB, cough, sputum  No n/v/d, abd pain, melena, hematochezia  No edema  No rash  No anxiety  No back pain, joint pain  No bleeding, bruising  No dysuria, hematuria    T(C): 36.7 (08-28-24 @ 11:52), Max: 37.3 (08-28-24 @ 05:32)  HR: 70 (08-28-24 @ 11:52) (70 - 73)  BP: 122/72 (08-28-24 @ 11:52) (122/72 - 138/73)  RR: 18 (08-28-24 @ 11:52) (18 - 18)  SpO2: 94% (08-28-24 @ 11:52) (94% - 97%)  Wt(kg): --    PE  NAD  Awake, alert  Anicteric, MMM  RRR  CTAB  Abd soft, NT, ND  No c/c/e  lower ext cellulitis                           12.0   25.23 )-----------( 168      ( 28 Aug 2024 07:38 )             37.0       08-28    138  |  102  |  16  ----------------------------<  156<H>  4.2   |  23  |  0.82    Ca    9.9      28 Aug 2024 07:41    ACC: [id number] EXAM:  CT ABDOMEN AND PELVIS IC   ORDERED BY: IRVIN QUIÑONES     ACC: [id number] EXAM:  CT CHEST IC   ORDERED BY: IRVIN QUIÑONES     PROCEDURE DATE:  08/27/2024          INTERPRETATION:  CLINICAL INFORMATION: 74-year-old female with CLL and   night sweats. Admitted with left lower extremity cellulitis. History   right breast cancer status post lumpectomy with mild residual right arm   edema    COMPARISON: None.    CONTRAST/COMPLICATIONS:  IV Contrast: Omnipaque 350  90 cc administered   10 cc discarded  Oral Contrast: NONE  Complications: None reported at time of study completion    PROCEDURE:  CT of the Chest, Abdomen and Pelvis was performed.  Sagittal and coronal reformats were performed.    FINDINGS:  CHEST:  LUNGS AND LARGE AIRWAYS: Patent central airways. Pulmonary nodules   measuring up to 6 mm right upper lobe image 22, right lower lobe images   73 and 77 of series 3.  PLEURA: No pleural effusion.  VESSELS: Within normal limits.  HEART: Heart size is normal. No pericardial effusion. Coronary artery and   aortic valve calcification.  MEDIASTINUM AND OMAR: No lymphadenopathy.  CHEST WALL AND LOWER NECK: Within normal limits.    ABDOMEN AND PELVIS:  LIVER: Several subcentimeter hypodensities at liver dome. Scattered   geographic hypodense zones suggesting focal steatosis.  BILE DUCTS: Normal caliber.  GALLBLADDER: Within normal limits.  SPLEEN: Within normal limits.  PANCREAS: Within normal limits.  ADRENALS: Within normal limits.  KIDNEYS/URETERS: 3.2 cm cyst with punctate calcifications    BLADDER: Within normal limits.  REPRODUCTIVE ORGANS: Uterus and adnexa within normal limits.    BOWEL: No bowel obstruction. Appendix normal.  PERITONEUM/RETROPERITONEUM: Within normal limits  VESSELS: Within normal limits.  LYMPH NODES: Multiple small retroperitoneal and pelvic lymph nodes.   Prominent bilateral obturator lymph nodes measuring up to 3.8 x 1.6 cm on   the left. Prominent left inguinal lymph nodes measuring up to 3.2 x 2.2   centimeters.  ABDOMINAL WALL: Within normal limits.  BONES: Degenerative change.    IMPRESSION:  Enlarged pelvic and inguinal lymph nodes.    Focal hepatic steatosis.    Several small pulmonary nodules. May obtain follow-up in one year as   clinically indicated

## 2024-09-04 RX ORDER — PRIMIDONE 50 MG/1
1 TABLET ORAL
Refills: 0 | DISCHARGE

## 2024-09-04 RX ORDER — MAGNESIUM OXIDE TAB 400 MG (240 MG ELEMENTAL MG) 400 (240 MG) MG
1 TAB ORAL
Refills: 0 | DISCHARGE

## 2024-09-04 RX ORDER — PENTOXIFYLLINE 400 MG/1
1 TABLET, EXTENDED RELEASE ORAL
Refills: 0 | DISCHARGE

## 2024-09-04 RX ORDER — PROPRANOLOL HCL 60 MG
1 CAPSULE, EXTENDED RELEASE 24HR ORAL
Refills: 0 | DISCHARGE

## 2024-09-04 RX ORDER — AMLODIPINE BESYLATE 10 MG/1
1 TABLET ORAL
Refills: 0 | DISCHARGE

## 2024-09-04 RX ORDER — SERTRALINE HYDROCHLORIDE 50 MG/1
1 TABLET, FILM COATED ORAL
Refills: 0 | DISCHARGE

## 2024-09-04 RX ORDER — HYDROCHLOROTHIAZIDE 12.5 MG/1
1 CAPSULE ORAL
Refills: 0 | DISCHARGE

## 2024-09-04 RX ORDER — SITAGLIPTIN AND METFORMIN HYDROCHLORIDE 500; 50 MG/1; MG/1
1 TABLET, FILM COATED ORAL
Refills: 0 | DISCHARGE

## 2024-09-04 RX ORDER — ICOSAPENT ETHYL 1 G/1
2 CAPSULE, LIQUID FILLED ORAL
Refills: 0 | DISCHARGE

## 2024-09-04 RX ORDER — ZOLPIDEM TARTRATE 5 MG/1
1 TABLET, FILM COATED ORAL
Refills: 0 | DISCHARGE

## 2024-09-04 RX ORDER — LOSARTAN POTASSIUM 50 MG/1
1 TABLET ORAL
Refills: 0 | DISCHARGE

## 2024-09-04 RX ORDER — ERGOCALCIFEROL (VITAMIN D2) 200 MCG/ML
1 DROPS ORAL
Refills: 0 | DISCHARGE